# Patient Record
Sex: MALE | Race: WHITE
[De-identification: names, ages, dates, MRNs, and addresses within clinical notes are randomized per-mention and may not be internally consistent; named-entity substitution may affect disease eponyms.]

---

## 2020-04-21 ENCOUNTER — HOSPITAL ENCOUNTER (INPATIENT)
Dept: HOSPITAL 92 - ERS | Age: 60
LOS: 4 days | Discharge: HOME | DRG: 247 | End: 2020-04-25
Attending: INTERNAL MEDICINE | Admitting: INTERNAL MEDICINE
Payer: SELF-PAY

## 2020-04-21 ENCOUNTER — HOSPITAL ENCOUNTER (EMERGENCY)
Dept: HOSPITAL 18 - NAV ERS | Age: 60
End: 2020-04-21
Payer: SELF-PAY

## 2020-04-21 VITALS — BODY MASS INDEX: 31.3 KG/M2

## 2020-04-21 DIAGNOSIS — E78.5: ICD-10-CM

## 2020-04-21 DIAGNOSIS — E87.6: ICD-10-CM

## 2020-04-21 DIAGNOSIS — Z79.899: ICD-10-CM

## 2020-04-21 DIAGNOSIS — R00.1: ICD-10-CM

## 2020-04-21 DIAGNOSIS — I21.4: Primary | ICD-10-CM

## 2020-04-21 DIAGNOSIS — F17.210: ICD-10-CM

## 2020-04-21 DIAGNOSIS — E78.00: ICD-10-CM

## 2020-04-21 DIAGNOSIS — I25.10: ICD-10-CM

## 2020-04-21 DIAGNOSIS — E66.9: ICD-10-CM

## 2020-04-21 DIAGNOSIS — K21.9: ICD-10-CM

## 2020-04-21 DIAGNOSIS — I47.2: ICD-10-CM

## 2020-04-21 DIAGNOSIS — I42.9: ICD-10-CM

## 2020-04-21 LAB
ALBUMIN SERPL BCG-MCNC: 3.8 G/DL (ref 3.5–5)
ALP SERPL-CCNC: 76 U/L (ref 40–110)
ALT SERPL W P-5'-P-CCNC: 24 U/L (ref 8–55)
ANION GAP SERPL CALC-SCNC: 11 MMOL/L (ref 10–20)
ANION GAP SERPL CALC-SCNC: 14 MMOL/L (ref 10–20)
AST SERPL-CCNC: 28 U/L (ref 5–34)
BASOPHILS # BLD AUTO: 0.1 THOU/UL (ref 0–0.2)
BASOPHILS NFR BLD AUTO: 0.8 % (ref 0–1)
BILIRUB SERPL-MCNC: 0.3 MG/DL (ref 0.2–1.2)
BUN SERPL-MCNC: 13 MG/DL (ref 8.4–25.7)
BUN SERPL-MCNC: 13 MG/DL (ref 8.4–25.7)
CALCIUM SERPL-MCNC: 8.6 MG/DL (ref 7.8–10.44)
CALCIUM SERPL-MCNC: 8.8 MG/DL (ref 7.8–10.44)
CHD RISK SERPL-RTO: 5.1 (ref ?–4.5)
CHLORIDE SERPL-SCNC: 107 MMOL/L (ref 98–107)
CHLORIDE SERPL-SCNC: 109 MMOL/L (ref 98–107)
CHOLEST SERPL-MCNC: 190 MG/DL
CK MB SERPL-MCNC: 27.6 NG/ML (ref 0–6.6)
CK SERPL-CCNC: 333 U/L (ref 30–200)
CO2 SERPL-SCNC: 22 MMOL/L (ref 22–29)
CO2 SERPL-SCNC: 23 MMOL/L (ref 22–29)
CREAT CL PREDICTED SERPL C-G-VRATE: 0 ML/MIN (ref 70–130)
CREAT CL PREDICTED SERPL C-G-VRATE: 126 ML/MIN (ref 70–130)
EOSINOPHIL # BLD AUTO: 0.2 THOU/UL (ref 0–0.7)
EOSINOPHIL NFR BLD AUTO: 2.5 % (ref 0–10)
GLOBULIN SER CALC-MCNC: 2.5 G/DL (ref 2.4–3.5)
GLUCOSE SERPL-MCNC: 113 MG/DL (ref 70–105)
GLUCOSE SERPL-MCNC: 135 MG/DL (ref 70–105)
HDLC SERPL-MCNC: 37 MG/DL
HGB BLD-MCNC: 13.7 G/DL (ref 14–18)
HGB BLD-MCNC: 13.9 G/DL (ref 14–18)
LDLC SERPL CALC-MCNC: 126 MG/DL
LYMPHOCYTES # BLD AUTO: 3 THOU/UL (ref 1.2–3.4)
LYMPHOCYTES NFR BLD AUTO: 36.8 % (ref 21–51)
MAGNESIUM SERPL-MCNC: 2.1 MG/DL (ref 1.6–2.6)
MCH RBC QN AUTO: 31.6 PG (ref 27–31)
MCV RBC AUTO: 94.5 FL (ref 78–98)
MONOCYTES # BLD AUTO: 0.6 THOU/UL (ref 0.11–0.59)
MONOCYTES NFR BLD AUTO: 7.4 % (ref 0–10)
NEUTROPHILS # BLD AUTO: 4.2 THOU/UL (ref 1.4–6.5)
NEUTROPHILS NFR BLD AUTO: 52.6 % (ref 42–75)
PLATELET # BLD AUTO: 209 THOU/UL (ref 130–400)
PLATELET # BLD AUTO: 216 THOU/UL (ref 130–400)
POTASSIUM SERPL-SCNC: 3.3 MMOL/L (ref 3.5–5.1)
POTASSIUM SERPL-SCNC: 3.6 MMOL/L (ref 3.5–5.1)
RBC # BLD AUTO: 4.39 MILL/UL (ref 4.7–6.1)
SODIUM SERPL-SCNC: 139 MMOL/L (ref 136–145)
SODIUM SERPL-SCNC: 140 MMOL/L (ref 136–145)
TRIGL SERPL-MCNC: 134 MG/DL (ref ?–150)
TROPONIN I SERPL DL<=0.01 NG/ML-MCNC: 3.71 NG/ML (ref ?–0.03)
TROPONIN I SERPL DL<=0.01 NG/ML-MCNC: 7.48 NG/ML (ref ?–0.03)
WBC # BLD AUTO: 8 THOU/UL (ref 4.8–10.8)

## 2020-04-21 PROCEDURE — 80061 LIPID PANEL: CPT

## 2020-04-21 PROCEDURE — 5A1223Z PERFORMANCE OF CARDIAC PACING, CONTINUOUS: ICD-10-PCS | Performed by: INTERNAL MEDICINE

## 2020-04-21 PROCEDURE — 83735 ASSAY OF MAGNESIUM: CPT

## 2020-04-21 PROCEDURE — 84439 ASSAY OF FREE THYROXINE: CPT

## 2020-04-21 PROCEDURE — C1874 STENT, COATED/COV W/DEL SYS: HCPCS

## 2020-04-21 PROCEDURE — 93454 CORONARY ARTERY ANGIO S&I: CPT

## 2020-04-21 PROCEDURE — 83880 ASSAY OF NATRIURETIC PEPTIDE: CPT

## 2020-04-21 PROCEDURE — 85025 COMPLETE CBC W/AUTO DIFF WBC: CPT

## 2020-04-21 PROCEDURE — 76942 ECHO GUIDE FOR BIOPSY: CPT

## 2020-04-21 PROCEDURE — C1757 CATH, THROMBECTOMY/EMBOLECT: HCPCS

## 2020-04-21 PROCEDURE — C9600 PERC DRUG-EL COR STENT SING: HCPCS

## 2020-04-21 PROCEDURE — 92928 PRQ TCAT PLMT NTRAC ST 1 LES: CPT

## 2020-04-21 PROCEDURE — 85730 THROMBOPLASTIN TIME PARTIAL: CPT

## 2020-04-21 PROCEDURE — 33210 INSERT ELECTRD/PM CATH SNGL: CPT

## 2020-04-21 PROCEDURE — C1725 CATH, TRANSLUMIN NON-LASER: HCPCS

## 2020-04-21 PROCEDURE — 94760 N-INVAS EAR/PLS OXIMETRY 1: CPT

## 2020-04-21 PROCEDURE — 80048 BASIC METABOLIC PNL TOTAL CA: CPT

## 2020-04-21 PROCEDURE — 92973 PRQ TRLUML C MCHN ASP THRMBC: CPT

## 2020-04-21 PROCEDURE — 96372 THER/PROPH/DIAG INJ SC/IM: CPT

## 2020-04-21 PROCEDURE — C1887 CATHETER, GUIDING: HCPCS

## 2020-04-21 PROCEDURE — 96360 HYDRATION IV INFUSION INIT: CPT

## 2020-04-21 PROCEDURE — 84484 ASSAY OF TROPONIN QUANT: CPT

## 2020-04-21 PROCEDURE — B2111ZZ FLUOROSCOPY OF MULTIPLE CORONARY ARTERIES USING LOW OSMOLAR CONTRAST: ICD-10-PCS | Performed by: INTERNAL MEDICINE

## 2020-04-21 PROCEDURE — C1769 GUIDE WIRE: HCPCS

## 2020-04-21 PROCEDURE — 71045 X-RAY EXAM CHEST 1 VIEW: CPT

## 2020-04-21 PROCEDURE — 93306 TTE W/DOPPLER COMPLETE: CPT

## 2020-04-21 PROCEDURE — 84443 ASSAY THYROID STIM HORMONE: CPT

## 2020-04-21 PROCEDURE — 80053 COMPREHEN METABOLIC PANEL: CPT

## 2020-04-21 PROCEDURE — 93005 ELECTROCARDIOGRAM TRACING: CPT

## 2020-04-21 PROCEDURE — 99153 MOD SED SAME PHYS/QHP EA: CPT

## 2020-04-21 PROCEDURE — 93010 ELECTROCARDIOGRAM REPORT: CPT

## 2020-04-21 PROCEDURE — 93798 PHYS/QHP OP CAR RHAB W/ECG: CPT

## 2020-04-21 PROCEDURE — 99152 MOD SED SAME PHYS/QHP 5/>YRS: CPT

## 2020-04-21 PROCEDURE — 027034Z DILATION OF CORONARY ARTERY, ONE ARTERY WITH DRUG-ELUTING INTRALUMINAL DEVICE, PERCUTANEOUS APPROACH: ICD-10-PCS | Performed by: INTERNAL MEDICINE

## 2020-04-21 PROCEDURE — 99285 EMERGENCY DEPT VISIT HI MDM: CPT

## 2020-04-21 PROCEDURE — 36415 COLL VENOUS BLD VENIPUNCTURE: CPT

## 2020-04-21 PROCEDURE — 82553 CREATINE MB FRACTION: CPT

## 2020-04-21 PROCEDURE — 83036 HEMOGLOBIN GLYCOSYLATED A1C: CPT

## 2020-04-21 PROCEDURE — 82550 ASSAY OF CK (CPK): CPT

## 2020-04-21 PROCEDURE — 4A023N7 MEASUREMENT OF CARDIAC SAMPLING AND PRESSURE, LEFT HEART, PERCUTANEOUS APPROACH: ICD-10-PCS | Performed by: INTERNAL MEDICINE

## 2020-04-21 PROCEDURE — 02C03ZZ EXTIRPATION OF MATTER FROM CORONARY ARTERY, ONE ARTERY, PERCUTANEOUS APPROACH: ICD-10-PCS | Performed by: INTERNAL MEDICINE

## 2020-04-21 RX ADMIN — Medication SCH ML: at 20:38

## 2020-04-21 RX ADMIN — Medication SCH ML: at 08:21

## 2020-04-21 NOTE — CON
DATE OF CONSULTATION:  04/21/2020



REASON FOR CONSULTATION:  Non-ST-segment MI.



HISTORY OF PRESENT ILLNESS:  Mr. Mustafa is a 59-year-old gentleman, who recently

presented with acute onset chest pain.  It began at 1:30 in the morning.  He

presented to the emergency room with the above.  He was given one dose of Lovenox.

He was placed on telemetry monitoring.  I was called at 8 o'clock this morning,

because the patient continued to have pain with elevated troponin of 7.0.  The

patient does have a previous history of tobacco abuse. 



PAST MEDICAL HISTORY:  Acid reflux.



SOCIAL HISTORY:  As above.  He is currently .



FAMILY HISTORY:  Positive for MI.



ALLERGIES:  NONE.



MEDICATIONS:  None.



REVIEW OF SYSTEMS:  A 10-point review of systems is reviewed and is as above,

otherwise negative. 



PHYSICAL EXAMINATION:

GENERAL:  Patient is a pleasant male who is in no acute distress.  The patient

appears their stated age. 

VITAL SIGNS:  Blood pressure 110/70, pulse 80, respirations 20. 

NEUROLOGIC:  The patient is alert and oriented x3 with no focal neurologic deficits. 

HEENT:  Sclerae without icterus.  Mouth has moist mucous membranes with normal

pallor. 

NECK:  No JVD.  Carotid upstroke brisk.  No bruits bilaterally. 

LUNGS:  Clear to auscultation with unlabored respirations. 

BACK:  No scoliosis or kyphosis. 

CARDIAC:  Regular rate and rhythm with normal S1 and S2.  No S3 or S4 noted.  No

significant rubs, murmurs, thrills, or gallops noted throughout the precordium.  PMI

is not displaced.  There is no parasternal heave. 

ABDOMEN:  Soft, nontender, nondistended.  No peritoneal signs present.  No

hepatosplenomegaly.  No abnormal striae. 

EXTREMITIES:  2+ femoral and 2+ dorsalis pedis pulses.  No cyanosis, clubbing, or

edema. 

SKIN:  No gross abnormalities.



PERTINENT LABORATORY DATA:  Hemoglobin 13.7.  Creatinine 0.91.  Troponin 7.48.  BNP

of 114. 



EKG, normal sinus rhythm with Q-waves noted inferiorly.



IMPRESSION:  

1. Elevated troponin.

2. Unstable angina.

3. Tobacco abuse.



RECOMMENDATIONS:  Given Mr. Danielles troponin is positive with continued pain and

episodes of bradycardia with a 4-second pause, I am concerned about stenosis or

occlusion of the right coronary artery.  Given continued pain, I would recommend

urgent coronary angiography plus PCI.  I discussed the procedure in full detail with

Mr. Mustafa.  The risks of the procedure were also discussed.  The risks of the

procedure include but are not limited to the following:  Death, stroke, MI, need for

emergency surgery, loss of limb, bleeding, and infection, as well as a reaction to

the dye causing kidney failure and needing long-term dialysis.  I also discussed the

risks of PCI to include all of the above including coronary dissection and

perforation in addition to acute stent thrombosis and restenosis.  All questions

about the procedure were answered.  Given the above, the patient agreed to proceed

with coronary angiography and possible PCI.  Also I discussed drug-coated versus

nondrug-coated stent placement.  There were no complications proceed if needed.

Further recommendations pending the above. 







Job ID:  [id number]

## 2020-04-21 NOTE — EKG
Test Reason : POST STENT-RCA

Blood Pressure : ***/*** mmHG

Vent. Rate : 050 BPM     Atrial Rate : 051 BPM

   P-R Int : 000 ms          QRS Dur : 158 ms

    QT Int : 584 ms       P-R-T Axes : 000 088 -67 degrees

   QTc Int : 532 ms

 

Electronic ventricular pacemaker

When compared with ECG of 21-APR-2020 08:17, (Unconfirmed)

Electronic ventricular pacemaker has replaced Sinus rhythm

Confirmed by DR. NATALIA MARTINEZ (3) on 4/21/2020 5:41:37 PM

 

Referred By:  CECE           Confirmed By:DR. NATALIA MARTINEZ

## 2020-04-21 NOTE — HP
CHIEF COMPLAINT:  Chest pain.



HISTORY OF PRESENT ILLNESS:  Mr. Mustafa is a 59-year-old male with a past medical

history of GERD, presents to [facility] with chest pain that has been

going on and off for the past 2 weeks.  Last evening, the patient had the pain

severe enough to tell his wife, and she gave him nitroglycerin and aspirin.  He

reports dizziness and weakness for the past 3 weeks, including episodes where he

thought he might pass out.  On workup in the emergency room, the patient had an

elevated troponin, more than 1.  The patient was bradycardic with heart rate in the

40s.  The patient transferred to this facility for further management and Cardiology

consultation.  In the emergency room, the patient received aspirin and Lovenox 1

mg/kg. 



PAST MEDICAL HISTORY:  GERD.



PAST SURGICAL HISTORY:  No surgical history.



SOCIAL HISTORY:  He currently smokes about a pack a day.



FAMILY HISTORY:  Father had a heart attack in his 50s.



ALLERGIES:  NO KNOWN ALLERGIES.



HOME MEDICATIONS:  Please see home medication reconciliation form for updated

medications. 



REVIEW OF SYSTEMS:  A review of 14 systems is negative, except what is mentioned in

the history of present illness. 



PHYSICAL EXAMINATION:

VITAL SIGNS:  Blood pressure is 106/66, pulse is 48, respiratory rate is 14, and

temperature is 97.5. 

HEAD AND NECK:  Normocephalic and atraumatic.  Neck is supple.  No JVD. 

CHEST:  Fair bilateral air entry. 

HEART:  S1 and S2.  Regular. 

ABDOMEN:  Soft and nontender.  Bowel sounds are present. 

NEUROLOGIC:  Awake, alert, and oriented x3. 

PSYCHIATRIC:  Normal mood. 

EXTREMITIES:  No clubbing or cyanosis. 

GENITOURINARY:  No suprapubic tenderness.  No flank tenderness.



LABORATORIES:  WBC 8.0, hemoglobin 13.9, and platelets 216.  Sodium 140, potassium

3.3, BUN 13, and creatinine 1.1.  Troponin is 1.13. 



ASSESSMENT:  

1. Non-ST-elevation myocardial infarction.

2. Hypokalemia.

3. Family history of coronary artery disease.

4. Cigarette smoker.



PLAN:  

1. Admit.

2. Telemetry monitoring.

3. Serial troponins.

4. The patient was given Lovenox in the ED 1 mg/kg, further anticoagulation as per

Cardiology. 

5. Keep the patient n.p.o. for now.

6. Consult Cardiology in the a.m. for evaluation and further recommendations.

7. Reconcile home medications.

8. DVT prophylaxis as appropriate.

9. Expected length of stay is 2 midnights or more.







Job ID:  [id number]

## 2020-04-21 NOTE — RAD
Exam: Chest one view



HISTORY:Chest pain



Comparison: None



FINDINGS:

Cardiac silhouette: Normal

Aorta: Unremarkable

Pulmonary vessels: Normal caliber

Costophrenic angles: Clear



LUNGS: No masses or consolidation. Scattered reticulonodular opacities.

Pneumothorax: None



Osseous abnormalities: None



IMPRESSION: Scattered reticular nodular opacities. Correlate for infiltrate versus interstitial edema
.



Reported By: Herminia Varghese 

Electronically Signed:  4/21/2020 7:48 AM

## 2020-04-21 NOTE — EKG
Test Reason : C/O CHEST PAIN

Blood Pressure : ***/*** mmHG

Vent. Rate : 040 BPM     Atrial Rate : 040 BPM

   P-R Int : 210 ms          QRS Dur : 088 ms

    QT Int : 522 ms       P-R-T Axes : 066 060 165 degrees

   QTc Int : 425 ms

 

Marked sinus bradycardia with marked sinus arrhythmia with 1st degree A-V block

Possible Inferior infarct (cited on or before 21-APR-2020)



Abnormal ECG

When compared with ECG of 21-APR-2020 02:58, (Unconfirmed)

No significant change was found

Confirmed by DR. NATALIA MARTINEZ (3) on 4/21/2020 5:39:58 PM

 

Referred By:  VINNY           Confirmed By:DR. NATALIA MARTINEZ

## 2020-04-22 LAB
ALBUMIN SERPL BCG-MCNC: 3.4 G/DL (ref 3.5–5)
ALP SERPL-CCNC: 65 U/L (ref 40–110)
ALT SERPL W P-5'-P-CCNC: 32 U/L (ref 8–55)
ANION GAP SERPL CALC-SCNC: 10 MMOL/L (ref 10–20)
AST SERPL-CCNC: 107 U/L (ref 5–34)
BASOPHILS # BLD AUTO: 0.1 THOU/UL (ref 0–0.2)
BASOPHILS NFR BLD AUTO: 0.9 % (ref 0–1)
BILIRUB SERPL-MCNC: 0.7 MG/DL (ref 0.2–1.2)
BUN SERPL-MCNC: 11 MG/DL (ref 8.4–25.7)
CALCIUM SERPL-MCNC: 8.3 MG/DL (ref 7.8–10.44)
CHLORIDE SERPL-SCNC: 110 MMOL/L (ref 98–107)
CO2 SERPL-SCNC: 21 MMOL/L (ref 22–29)
CREAT CL PREDICTED SERPL C-G-VRATE: 141 ML/MIN (ref 70–130)
EOSINOPHIL # BLD AUTO: 0.1 THOU/UL (ref 0–0.7)
EOSINOPHIL NFR BLD AUTO: 1.1 % (ref 0–10)
GLOBULIN SER CALC-MCNC: 2.3 G/DL (ref 2.4–3.5)
GLUCOSE SERPL-MCNC: 104 MG/DL (ref 70–105)
HGB BLD-MCNC: 13.1 G/DL (ref 14–18)
LYMPHOCYTES # BLD: 2.3 THOU/UL (ref 1.2–3.4)
LYMPHOCYTES NFR BLD AUTO: 28.2 % (ref 21–51)
MAGNESIUM SERPL-MCNC: 1.9 MG/DL (ref 1.6–2.6)
MCH RBC QN AUTO: 31.7 PG (ref 27–31)
MCV RBC AUTO: 96.6 FL (ref 78–98)
MONOCYTES # BLD AUTO: 0.8 THOU/UL (ref 0.11–0.59)
MONOCYTES NFR BLD AUTO: 9.7 % (ref 0–10)
NEUTROPHILS # BLD AUTO: 4.9 THOU/UL (ref 1.4–6.5)
NEUTROPHILS NFR BLD AUTO: 60.1 % (ref 42–75)
PLATELET # BLD AUTO: 170 THOU/UL (ref 130–400)
POTASSIUM SERPL-SCNC: 3.4 MMOL/L (ref 3.5–5.1)
RBC # BLD AUTO: 4.13 MILL/UL (ref 4.7–6.1)
SODIUM SERPL-SCNC: 138 MMOL/L (ref 136–145)
WBC # BLD AUTO: 8.1 THOU/UL (ref 4.8–10.8)

## 2020-04-22 RX ADMIN — Medication SCH ML: at 09:11

## 2020-04-22 RX ADMIN — Medication SCH ML: at 21:03

## 2020-04-22 RX ADMIN — ASPIRIN 81 MG CHEWABLE TABLET SCH MG: 81 TABLET CHEWABLE at 08:48

## 2020-04-22 NOTE — CON
DATE OF CONSULTATION:  



HISTORY OF PRESENT ILLNESS:  A 59-year-old gentleman who is status post emergency

cardiac cath with intervention.  Please review the cardiologist's note. 



The patient is a pack-a-day smoker for 30 years without prior history of TB,

pneumonia, or bronchial asthma.  In fact, he tells me this is the first time in the

hospital.  He is bradycardic. 



Emergency cardiac cath revealed occlusion of his RCA.  He had a stent placed. 



Most days, he has no issues, no shortness of breath, coughing, or wheezing.



PAST MEDICAL HISTORY:  High cholesterol.



PREVIOUS SURGERIES:  None.



CARDIAC MEDICATION:  Prilosec.



SOCIAL HISTORY:  Alcohol, none.  Tobacco, pack a day.  Social history otherwise

unremarkable. 



FAMILY HISTORY:  Unremarkable.



REVIEW OF SYSTEMS:  Ten-point negative.  He may snore.



PHYSICAL EXAMINATION:

VITAL SIGNS:  His ICU pulse is 63.  He has a temporary pacemaker.  Blood pressure

125/60, respiratory rate 18, pulse 80, afebrile. 

GENERAL:  No distress. 

CHEST:  Decreased breath sounds.  No wheezing. 

CARDIAC:  Normal S1 and S2. 

ABDOMEN:  No masses.



DIAGNOSTIC STUDIES:  Chest x-ray is normal.  Thyroid function is normal.  BNP is

normal.  H and H are stable.  Lytes are normal. 



IMPRESSION:  Acute coronary syndrome, emergency cardiac cath, significant

bradycardia with a temporary pacemaker. 



Continue supportive care.  We will follow while in the ICU.  He was advised to

refrain from smoking. 



Consultation note, 70 minutes, 50% direct patient care.







Job ID:  [id number]

## 2020-04-22 NOTE — EKG
Test Reason : 

Blood Pressure : ***/*** mmHG

Vent. Rate : 052 BPM     Atrial Rate : 052 BPM

   P-R Int : 166 ms          QRS Dur : 088 ms

    QT Int : 474 ms       P-R-T Axes : 070 051 -70 degrees

   QTc Int : 440 ms

 

Sinus bradycardia

Inferior infarct , age undetermined



Abnormal ECG

When compared with ECG of 21-APR-2020 10:43,

Sinus rhythm has replaced Electronic ventricular pacemaker

Confirmed by DR. NATALIA MARTINEZ (3) on 4/22/2020 2:03:44 PM

 

Referred By:  CECE           Confirmed By:DR. NATALIA MARTINEZ

## 2020-04-22 NOTE — PRG
DATE OF SERVICE:  04/22/2020



SUBJECTIVE:  Mr. Mustafa is doing much better.  No current complaints.  No chest pain

or pressure noted.  The temporary pacemaker is stable.  He is no longer pacer

dependent as he was yesterday morning and afternoon. 



OBJECTIVE:  VITAL SIGNS:  Blood pressure 120/76, pulse 64, temperature afebrile. 

LUNGS:  Clear to auscultation. 

HEART:  Regular rate and rhythm. 

ABDOMEN:  Soft, nontender, and nondistended. 

EXTREMITIES:  No edema.



IMPRESSION:  

1. Acute myocardial infarction.

2. Bradycardia secondary to recent myocardial infarction.

3. Tobacco abuse.



RECOMMENDATIONS:  

1. Mr. Mustafa is currently doing well.  We will turn off pacemaker for now and

discontinue this p.m. if stable. 

2.  on cessation of all tobacco products.

3. Hold beta-blocker therapy given recent bradycardia.

4. Continue aspirin and atorvastatin in addition to Plavix and pantoprazole.

5. Plan is to keep in the ICU overnight and transfer to a regular room tomorrow if

stable. 







Job ID:  [id number]

## 2020-04-22 NOTE — PDOC.HOSPP
- Subjective


Encounter Date: 04/22/20


Encounter Time: 09:30


Subjective: 





Patient seen and examined for NSTEMI. No CP. On NTG drip. No new complaints. No 

overnight events





- Objective


Vital Signs & Weight: 


 Vital Signs (12 hours)











  Temp Pulse Resp BP Pulse Ox


 


 04/22/20 16:00   67  22 H  153/82 H 


 


 04/22/20 12:00   62  19  142/75 H 


 


 04/22/20 08:00  98.2 F    


 


 04/22/20 07:58      97








 Weight











Weight                         224 lb 6.4 oz











 Most Recent Monitor Data











Heart Rate from ECG            60


 


NIBP                           142/83


 


NIBP BP-Mean                   102


 


Respiration from ECG           16


 


SpO2                           98














I&O: 


 











 04/21/20 04/22/20 04/23/20





 06:59 06:59 06:59


 


Intake Total  2240.7 513.2


 


Output Total  1375 1145


 


Balance  865.7 -631.8











Result Diagrams: 


 04/22/20 03:05





 04/22/20 03:05


EKG Reviewed by me: Yes (Tele SR)





Hospitalist ROS





- Review of Systems


Respiratory: denies: cough, dry, shortness of breath, hemoptysis, SOB with 

excertion, pleuritic pain, sputum, wheezing, other


Cardiovascular: denies: chest pain, palpitations, orthopnea, paroxysmal noc. 

dyspnea, edema, light headedness, other





- Medication


Medications: 


Active Medications











Generic Name Dose Route Start Last Admin





  Trade Name Freq  PRN Reason Stop Dose Admin


 


Aspirin  81 mg  04/22/20 09:00  04/22/20 08:48





  Aspirin Chewable  PO   81 mg





  DAILY CHICHI   Administration





     





     





     





     


 


Atorvastatin Calcium  80 mg  04/21/20 21:00  04/21/20 20:23





  Lipitor  PO   80 mg





  HS CHICHI   Administration





     





     





     





     


 


Clopidogrel Bisulfate  75 mg  04/22/20 09:00  04/22/20 08:48





  Plavix  PO   75 mg





  DAILY CHICHI   Administration





     





     





     





     


 


Morphine Sulfate  2 mg  04/21/20 04:16  04/21/20 15:33





  Morphine  SLOW IVP   2 mg





  Q4H PRN   Administration





  Moderate to Severe Pain (6-10)   





     





     





     


 


Pantoprazole Sodium  40 mg  04/21/20 21:00  04/21/20 20:23





  Protonix  PO   40 mg





  HS CHICHI   Administration





     





     





     





     


 


Sodium Chloride  10 ml  04/21/20 09:00  04/22/20 09:11





  Flush - Normal Saline  IVF   10 ml





  Q12HR CHICHI   Administration





     





     





     





     














- Exam


General Appearance: NAD


Neck: supple, no JVD


Heart: RRR, no gallops, no rubs, normal peripheral pulses


Respiratory: CTAB, no wheezes, no rales, no ronchi


Gastrointestinal: soft, non-tender, non-distended, normal bowel sounds, no 

guarding, no rigidity


Extremities: no cyanosis, no clubbing, no edema


Neurological: no new deficit


Psychiatric: normal affect, A&O x 3





Hosp A/P





- Plan


DVT proph w/SCDs





NSTEMI


s/p RCA stent


Bradycardia with sinus pauses due to above


Tob dep


Obesity BMI 31.3


GERD





PLAN:


Cont ASA/Plavix


Cont NTG drip


Temporary pacer turned off


Counselled to quit smoking


AM labs


No Betablockers due to bradycardia

## 2020-04-23 LAB
ANION GAP SERPL CALC-SCNC: 10 MMOL/L (ref 10–20)
ANION GAP SERPL CALC-SCNC: 12 MMOL/L (ref 10–20)
BASOPHILS # BLD AUTO: 0 THOU/UL (ref 0–0.2)
BASOPHILS NFR BLD AUTO: 0.4 % (ref 0–1)
BUN SERPL-MCNC: 12 MG/DL (ref 8.4–25.7)
BUN SERPL-MCNC: 9 MG/DL (ref 8.4–25.7)
CALCIUM SERPL-MCNC: 8.7 MG/DL (ref 7.8–10.44)
CALCIUM SERPL-MCNC: 9.2 MG/DL (ref 7.8–10.44)
CHLORIDE SERPL-SCNC: 108 MMOL/L (ref 98–107)
CHLORIDE SERPL-SCNC: 108 MMOL/L (ref 98–107)
CO2 SERPL-SCNC: 24 MMOL/L (ref 22–29)
CO2 SERPL-SCNC: 25 MMOL/L (ref 22–29)
CREAT CL PREDICTED SERPL C-G-VRATE: 132 ML/MIN (ref 70–130)
CREAT CL PREDICTED SERPL C-G-VRATE: 138 ML/MIN (ref 70–130)
EOSINOPHIL # BLD AUTO: 0.1 THOU/UL (ref 0–0.7)
EOSINOPHIL NFR BLD AUTO: 1.7 % (ref 0–10)
GLUCOSE SERPL-MCNC: 90 MG/DL (ref 70–105)
GLUCOSE SERPL-MCNC: 96 MG/DL (ref 70–105)
HGB BLD-MCNC: 14 G/DL (ref 14–18)
LYMPHOCYTES # BLD: 2.3 THOU/UL (ref 1.2–3.4)
LYMPHOCYTES NFR BLD AUTO: 25.7 % (ref 21–51)
MAGNESIUM SERPL-MCNC: 2.1 MG/DL (ref 1.6–2.6)
MCH RBC QN AUTO: 32.5 PG (ref 27–31)
MCV RBC AUTO: 95.6 FL (ref 78–98)
MONOCYTES # BLD AUTO: 0.8 THOU/UL (ref 0.11–0.59)
MONOCYTES NFR BLD AUTO: 9.3 % (ref 0–10)
NEUTROPHILS # BLD AUTO: 5.6 THOU/UL (ref 1.4–6.5)
NEUTROPHILS NFR BLD AUTO: 63 % (ref 42–75)
PLATELET # BLD AUTO: 188 THOU/UL (ref 130–400)
POTASSIUM SERPL-SCNC: 3.7 MMOL/L (ref 3.5–5.1)
POTASSIUM SERPL-SCNC: 3.8 MMOL/L (ref 3.5–5.1)
RBC # BLD AUTO: 4.3 MILL/UL (ref 4.7–6.1)
SODIUM SERPL-SCNC: 139 MMOL/L (ref 136–145)
SODIUM SERPL-SCNC: 140 MMOL/L (ref 136–145)
WBC # BLD AUTO: 8.9 THOU/UL (ref 4.8–10.8)

## 2020-04-23 RX ADMIN — Medication SCH ML: at 08:03

## 2020-04-23 RX ADMIN — Medication SCH ML: at 20:51

## 2020-04-23 RX ADMIN — ASPIRIN 81 MG CHEWABLE TABLET SCH MG: 81 TABLET CHEWABLE at 08:02

## 2020-04-23 NOTE — PDOC.HOSPP
- Subjective


Encounter Date: 04/23/20


Encounter Time: 17:00


Subjective: 





Patient seen and examined for NSTEMI. No new CP/Palpitations. No new 

complaints. No overnight events





- Objective


Vital Signs & Weight: 


 Vital Signs (12 hours)











  Temp Pulse Pulse Pulse Resp BP BP


 


 04/23/20 15:12  97.9 F  62    16  


 


 04/23/20 11:11       


 


 04/23/20 11:00  97.8 F  52 L    16  


 


 04/23/20 08:52    64  76   135/71  128/75


 


 04/23/20 08:00  98.1 F      


 


 04/23/20 07:52       














  BP Pulse Ox Pulse Ox Pulse Ox


 


 04/23/20 15:12  147/71 H  98  


 


 04/23/20 11:11   97  


 


 04/23/20 11:00  127/81  97  


 


 04/23/20 08:52    98  100


 


 04/23/20 08:00    


 


 04/23/20 07:52   93 L  








 Weight











Weight                         224 lb 6.4 oz











 Most Recent Monitor Data











Heart Rate from ECG            52


 


NIBP                           111/59


 


NIBP BP-Mean                   76


 


Respiration from ECG           16


 


SpO2                           96














I&O: 


 











 04/22/20 04/23/20 04/24/20





 06:59 06:59 06:59


 


Intake Total 2240.7 613.2 1220


 


Output Total 1375 3720 1250


 


Balance 865.7 -3106.8 -30











Result Diagrams: 


 04/23/20 03:17





 04/24/20 07:09


EKG Reviewed by me: Yes (Tele SB)





Hospitalist ROS





- Review of Systems


Respiratory: denies: cough, dry, shortness of breath, hemoptysis, SOB with 

excertion, pleuritic pain, sputum, wheezing, other


Cardiovascular: denies: chest pain, palpitations, orthopnea, paroxysmal noc. 

dyspnea, edema, light headedness, other





- Medication


Medications: 


Active Medications











Generic Name Dose Route Start Last Admin





  Trade Name Freq  PRN Reason Stop Dose Admin


 


Aspirin  81 mg  04/22/20 09:00  04/23/20 08:02





  Aspirin Chewable  PO   81 mg





  DAILY CHICHI   Administration





     





     





     





     


 


Atorvastatin Calcium  80 mg  04/21/20 21:00  04/22/20 21:02





  Lipitor  PO   80 mg





  HS CHICHI   Administration





     





     





     





     


 


Clopidogrel Bisulfate  75 mg  04/22/20 09:00  04/23/20 08:02





  Plavix  PO   75 mg





  DAILY CHICHI   Administration





     





     





     





     


 


Morphine Sulfate  2 mg  04/21/20 04:16  04/21/20 15:33





  Morphine  SLOW IVP   2 mg





  Q4H PRN   Administration





  Moderate to Severe Pain (6-10)   





     





     





     


 


Pantoprazole Sodium  40 mg  04/21/20 21:00  04/22/20 21:03





  Protonix  PO   40 mg





  HS CHICHI   Administration





     





     





     





     


 


Sodium Chloride  10 ml  04/21/20 09:00  04/23/20 08:03





  Flush - Normal Saline  IVF   10 ml





  Q12HR CHICHI   Administration





     





     





     





     














- Exam


General Appearance: NAD


Heart: RRR, no gallops


Respiratory: no wheezes, no ronchi


Gastrointestinal: non-tender, non-distended


Extremities: no cyanosis


Neurological: no new deficit





Hosp A/P





- Plan


DVT proph w/SCDs





NSTEMI


s/p RCA stent


Bradycardia with sinus pauses due to above


Tob dep


Obesity BMI 31.3


GERD





PLAN:


Cont ASA/Plavix


Cont Statins


Counselled to quit smoking


Transferred to Tele


No Betablockers due to bradycardia

## 2020-04-23 NOTE — PRG
DATE OF SERVICE:  04/23/2020



SUBJECTIVE:  Mr. Mustafa is doing very well.  His heart rate is stable overnight

after removing the temporary pacemaker.  No current complaints. 



OBJECTIVE:  VITAL SIGNS:  Blood pressure 127/81, pulse __________ temperature 97.8. 

LUNGS:  Clear to auscultation. 

HEART:  Regular rate and rhythm. 

ABDOMEN:  Soft, nontender, nondistended. 

EXTREMITIES:  No edema.



PERTINENT LABORATORY DATA:  Hemoglobin 14.



IMPRESSION:  

1. Non-Q-wave myocardial infarction.

2. Occluded right coronary artery.

3. Tobacco abuse.



RECOMMENDATIONS:  

1. Continue aspirin 81 q.a.m.

2. Continue atorvastatin 80 at bedtime.

3. Continue Plavix 75 daily.

4. Avoid beta-blocker therapy until the outpatient setting due to recent bradycardia.

5. Cessation of all tobacco products.

6. Okay from my standpoint to discharge home in a.m.  The patient is from the

[place], [place] area, and recommend he follow up with a cardiologist in the region. 







Job ID:  [id number]

## 2020-04-24 LAB
ANION GAP SERPL CALC-SCNC: 13 MMOL/L (ref 10–20)
BUN SERPL-MCNC: 12 MG/DL (ref 8.4–25.7)
CALCIUM SERPL-MCNC: 9.3 MG/DL (ref 7.8–10.44)
CHLORIDE SERPL-SCNC: 107 MMOL/L (ref 98–107)
CO2 SERPL-SCNC: 24 MMOL/L (ref 22–29)
CREAT CL PREDICTED SERPL C-G-VRATE: 132 ML/MIN (ref 70–130)
GLUCOSE SERPL-MCNC: 86 MG/DL (ref 70–105)
POTASSIUM SERPL-SCNC: 4.1 MMOL/L (ref 3.5–5.1)
SODIUM SERPL-SCNC: 140 MMOL/L (ref 136–145)

## 2020-04-24 RX ADMIN — Medication SCH ML: at 20:25

## 2020-04-24 RX ADMIN — Medication SCH ML: at 17:58

## 2020-04-24 RX ADMIN — ASPIRIN 81 MG CHEWABLE TABLET SCH MG: 81 TABLET CHEWABLE at 08:07

## 2020-04-24 NOTE — CON
DATE OF CONSULTATION:  04/24/2020



REASON FOR CONSULTATION:  Nonsustained ventricular tachycardia. 



Consultation is performed by Dr. Fred Dorantes.



HISTORY OF PRESENT ILLNESS:  Mr. Mustafa is a 59-year-old gentleman who came to [facility] on the 21st of April with chest pain.  This has been transient for 2

weeks proceeding his time in the hospital.  He was taken to the cath lab by Dr. Adair.  Given his chest pain, his bradycardia, there was concern for an RCA

lesion.  He was found to have a 100% occluded RCA.  He had significant bradycardia

with pauses up to 4 seconds starting even before PCI.  A temporary pacemaker was

placed and left in for few days after his catheterization.  On the 22nd, the rates

returned down and he was found to be no longer dependent on his temporary pacer and

it was removed that night.  On the 23rd, the patient continued to be bradycardiac as

he had been with heart rates in the mid to low 50s and fairly stable and

asymptomatic with these lower rates.  At 10:30 at night, he had a bradycardic

episode that showed an idioventricular escape rhythm followed by a brief

nonsustained ventricular tachycardia run of approximately 6 beats before returning

to sinus bradycardia.  Other than that isolated incident, his rhythm has been fairly

stable since having his temporary pacemaker removed.  EP consultation was requested

in light of his nonsustained ventricular tachycardia runs. 



Mr. Mustafa feels well.  He is no longer experiencing chest pain.  He denies any

heart racing, palpitations, syncope, near syncope, stroke, or stroke-like symptoms.

He is unaware of his nonsustained VT episode last night and denies any passing-out

episodes or dizziness, lightheadedness, weakness, or fatigue that would suggest

symptomatic bradycardia. 



REVIEW OF SYSTEMS:  12-point review of systems is unremarkable except that listed

above in HPI. 



PAST MEDICAL HISTORY:  GERD.



SOCIAL HISTORY:  He is .  History of prior tobacco habituation, but not

recently.  Denies alcohol or illicit drug use. 



FAMILY HISTORY:  Positive for MI.



ALLERGIES:  NONE.



MEDICATIONS:  None.



OBJECTIVE:  MOST RECENT VITAL SIGNS:  Temperature 97.5, pulse 52, blood pressure

119/64, respirations 18, oxygen 95% on room air. 

GENERAL:  Patient is alert and oriented.  Speech is clear.  Affect is appropriate.

He is in no apparent distress.  At the time of exam, resting comfortably in bed. 

HEENT:  Normocephalic, atraumatic.  Sclerae anicteric.  EOMs are intact.  Oral

mucosa is moist and pink with adequate dentition. 

NECK:  Supple without jugular venous distention or lymphadenopathy. 

HEART:  His heart rate is regularly regular, but slow with a crisp S1-S2 and PMI is

nondisplaced. 

LUNGS:  Clear to auscultation bilaterally without wheezes, crackles, or rhonchi.

Respirations are even and nonlabored. 

ABDOMEN:  Soft and nontender without palpable masses.  Positive bowel sounds are

noted throughout.  Hepatojugular reflux is negative. 

EXTREMITIES:  Warm and dry to touch.  Well perfused without clubbing, cyanosis, or

edema. 

NEUROLOGIC:  Grossly intact and nonfocal.  Gait was not assessed.



LABORATORY AND DIAGNOSTIC STUDIES:  Echocardiogram on 04/24/2020, EF mildly reduced

at 45%.  Hypokinetic motion of the inferior septal wall and LV, mild MR, mild TR,

normal PAT. 



Laboratory:  Hematology unremarkable.  Chemistry all within normal ranges.

Creatinine 0.83, potassium 4.1, magnesium 1.9. 



Telemetry and EKG shows sinus bradycardia with rates in the mid to low 50s, an

episode of idioventricular escape rhythm was seen last night around 10:30 that

progressed into a nonsustained ventricular tachycardia run with approximately 6

beats before returning to sinus bradycardia. 



IMPRESSION:  

1. Asymptomatic sinus bradycardia with previously seen pauses up to 4 seconds

requiring transient temporary pacemaker for approximately 36 hours following

stenting of the RCA. 

2. Idioventricular rhythm, progressing to a short nonsustained ventricular

tachycardia and asymptomatic. 

3. Mildly reduced left ventricular ejection fraction of 45% by echo on 04/24

following an acute myocardial infarction. 

4. Coronary artery disease, acute myocardial infarction with PCI to the RCA for 100%

occlusion on 04/21/2020. 



PLAN AND RECOMMENDATIONS:  At this point, I suspect his ventricular arrhythmias and

possibly bradycardia are still related to his recent revascularization of the RCA.

There is no definite indication for pacing or ICD therapy.  Unfortunately, he is

unable to be given any type of beta-blocker therapy for his cardiomyopathy with

nonsustained VT in light of his baseline bradycardia.  He is asymptomatic with his

bradycardia and this may improve as he progresses further out from his PCI.  For

now, I would refrain from any type of device implants and recommend

guideline-directed medical therapy for his cardiomyopathies.  In addition, it would

be koch for him to discharge with a 30-day monitor in light of his nonsustained VT

and an inability to take beta blockers to watch his rhythm status as he moves out

from revascularization. 



Thank you for allowing me to participate in the care of this patient.  Please let me

know if there are any additional concerns that I may assist with. 







Job ID:  [id number]

## 2020-04-24 NOTE — CON
DATE OF CONSULTATION:  04/24/2020



SUBJECTIVE:  Mr. Mustafa is doing well.  No current complaints.  He did have episode

of nonsustained VT overnight.  He was asymptomatic.  Overall, LVEF estimated at 45%.

 There is hypokinesis along the inferoseptal region. 



OBJECTIVE:  VITAL SIGNS:  Blood pressure 119/64, pulse 83, and temperature is

afebrile. 

LUNGS:  Clear to auscultation. 

HEART:  Regular rate and rhythm. 

ABDOMEN:  Soft, nontender, and nondistended. 

EXTREMITIES:  No edema.



IMPRESSION:  

1. Non-Q-wave myocardial infarction.

2. Nonsustained ventricular tachycardia.

3. Bradycardia.



RECOMMENDATIONS:  Mr. Mustafa is less than 3 days out from his recent MI.  He did

have 6 beats of nonsustained VT.  His LVEF is 45%.  Indication will be for beta

blocker therapy, although not recommended given bradycardia.  We will have EP

consult to visit with Mr. Mustafa.  We would likely recommend he stay overnight to

further assess his rhythm.  If he is stable over the next 24 hours, will be okay to

discharge home with close outpatient followup. 







Job ID:  [id number]

## 2020-04-24 NOTE — PDOC.HOSPP
- Subjective


Encounter Date: 04/24/20


Encounter Time: 09:00


Subjective: 





Patient seen and examined for NSTEMI. No new CP. No new complaints. No 

overnight events





- Objective


Vital Signs & Weight: 


 Vital Signs (12 hours)











  Temp Pulse Pulse Pulse Resp BP BP


 


 04/24/20 16:25  97.5 F L  50 L    20  


 


 04/24/20 12:25  97.5 F L  80    20  


 


 04/24/20 09:10    53 L  58 L   129/75  119/64














  BP Pulse Ox Pulse Ox Pulse Ox


 


 04/24/20 16:25  118/67  94 L  


 


 04/24/20 12:25  137/72  95  


 


 04/24/20 09:10    97  97








 Weight











Weight                         214 lb 6.4 oz











 Most Recent Monitor Data











Heart Rate from ECG            52


 


NIBP                           111/59


 


NIBP BP-Mean                   76


 


Respiration from ECG           16


 


SpO2                           96














I&O: 


 











 04/23/20 04/24/20 04/25/20





 06:59 06:59 06:59


 


Intake Total 613.2 3020 1440


 


Output Total 3720 1250 


 


Balance -3106.8 1770 1440











Result Diagrams: 


 04/23/20 03:17





 04/24/20 07:09


EKG Reviewed by me: Yes (Tele SR)





Hospitalist ROS





- Review of Systems


Respiratory: denies: cough, dry, shortness of breath, hemoptysis, SOB with 

excertion, pleuritic pain, sputum, wheezing, other


Cardiovascular: denies: chest pain, palpitations, orthopnea, paroxysmal noc. 

dyspnea, edema, light headedness, other





- Medication


Medications: 


Active Medications











Generic Name Dose Route Start Last Admin





  Trade Name Freq  PRN Reason Stop Dose Admin


 


Aspirin  81 mg  04/22/20 09:00  04/24/20 08:07





  Aspirin Chewable  PO   81 mg





  DAILY CHICHI   Administration





     





     





     





     


 


Atorvastatin Calcium  80 mg  04/21/20 21:00  04/23/20 20:51





  Lipitor  PO   80 mg





  HS CHICHI   Administration





     





     





     





     


 


Clopidogrel Bisulfate  75 mg  04/22/20 09:00  04/24/20 08:07





  Plavix  PO   75 mg





  DAILY CHICHI   Administration





     





     





     





     


 


Morphine Sulfate  2 mg  04/21/20 04:16  04/21/20 15:33





  Morphine  SLOW IVP   2 mg





  Q4H PRN   Administration





  Moderate to Severe Pain (6-10)   





     





     





     


 


Pantoprazole Sodium  40 mg  04/21/20 21:00  04/23/20 20:51





  Protonix  PO   40 mg





  HS CHICHI   Administration





     





     





     





     


 


Sodium Chloride  10 ml  04/21/20 09:00  04/24/20 17:58





  Flush - Normal Saline  IVF   10 ml





  Q12HR CHICHI   Administration





     





     





     





     














- Exam


General Appearance: NAD


Heart: RRR, no gallops, no rubs


Heart - other findings: bradycardic


Respiratory: CTAB, no rales, no ronchi


Gastrointestinal: soft, non-distended


Extremities: no cyanosis, no edema





Hosp A/P





- Plan


DVT proph w/SCDs





NSTEMI


s/p RCA stent


Bradycardia with sinus pauses due to above


Tob dep - counselled 


Obesity BMI 31.3


GERD





PLAN:


Cont ASA/Plavix


Cont Statins


Await EP input due to bradycardia


No Betablockers due to bradycardia


DC home once cleared by Cardiology

## 2020-04-25 VITALS — DIASTOLIC BLOOD PRESSURE: 71 MMHG | TEMPERATURE: 98.3 F | SYSTOLIC BLOOD PRESSURE: 116 MMHG

## 2020-04-25 RX ADMIN — Medication SCH ML: at 07:40

## 2020-04-25 RX ADMIN — ASPIRIN 81 MG CHEWABLE TABLET SCH MG: 81 TABLET CHEWABLE at 07:40

## 2020-04-25 NOTE — PDOC.HOSPP
- Subjective


Encounter Date: 04/25/20


Encounter Time: 14:08


Subjective: 





Mr. Mustafa was seen today in follow-up of NSTEMI. He does not have any 

complaints.





- Objective


Vital Signs & Weight: 


 Vital Signs (12 hours)











  Temp Pulse Pulse Pulse Resp BP BP


 


 04/25/20 11:45  98.3 F  57 L    20  


 


 04/25/20 09:45       


 


 04/25/20 09:13    58 L  52 L   126/67  128/67


 


 04/25/20 07:38  97.6 F  55 L    18  


 


 04/25/20 03:37  98.2 F  50 L    18  














  BP Pulse Ox Pulse Ox Pulse Ox


 


 04/25/20 11:45  116/71  95  


 


 04/25/20 09:45   95  


 


 04/25/20 09:13    98  99


 


 04/25/20 07:38  119/70  96  


 


 04/25/20 03:37  120/76  96  








 Weight











Weight                         215 lb











 Most Recent Monitor Data











Heart Rate from ECG            52


 


NIBP                           111/59


 


NIBP BP-Mean                   76


 


Respiration from ECG           16


 


SpO2                           96














I&O: 


 











 04/24/20 04/25/20 04/26/20





 06:59 06:59 06:59


 


Intake Total 3020 2040 


 


Output Total 1250  


 


Balance 1770 2040 











Result Diagrams: 


 04/23/20 03:17





 04/24/20 07:09





Hospitalist ROS





- Medication


Medications: 


Active Medications











Generic Name Dose Route Start Last Admin





  Trade Name Freq  PRN Reason Stop Dose Admin


 


Aspirin  81 mg  04/22/20 09:00  04/25/20 07:40





  Aspirin Chewable  PO   81 mg





  DAILY CHICHI   Administration





     





     





     





     


 


Atorvastatin Calcium  80 mg  04/21/20 21:00  04/24/20 20:25





  Lipitor  PO   80 mg





  HS CHICHI   Administration





     





     





     





     


 


Clopidogrel Bisulfate  75 mg  04/22/20 09:00  04/25/20 07:40





  Plavix  PO   75 mg





  DAILY CHICHI   Administration





     





     





     





     


 


Morphine Sulfate  2 mg  04/21/20 04:16  04/21/20 15:33





  Morphine  SLOW IVP   2 mg





  Q4H PRN   Administration





  Moderate to Severe Pain (6-10)   





     





     





     


 


Pantoprazole Sodium  40 mg  04/21/20 21:00  04/24/20 20:25





  Protonix  PO   40 mg





  HS CHICHI   Administration





     





     





     





     


 


Sodium Chloride  10 ml  04/21/20 09:00  04/25/20 07:40





  Flush - Normal Saline  IVF   10 ml





  Q12HR CHICHI   Administration





     





     





     





     














- Exam


Eye: PERRL


Heart: RRR, no murmur, no gallops, no rubs, normal peripheral pulses


Respiratory: CTAB, no wheezes, no rales, no ronchi, normal chest expansion


Gastrointestinal: soft, non-tender, non-distended, normal bowel sounds, no 

palpable masses


Extremities: no cyanosis





Hosp A/P


(1) NSTEMI (non-ST elevated myocardial infarction)


Code(s): I21.4 - NON-ST ELEVATION (NSTEMI) MYOCARDIAL INFARCTION   Status: 

Acute   





(2) Bradycardia


Code(s): R00.1 - BRADYCARDIA, UNSPECIFIED   Status: Acute   





(3) Dyslipidemia


Code(s): E78.5 - HYPERLIPIDEMIA, UNSPECIFIED   Status: Chronic   





- Plan





* NSTEMI- post PTCA and STENT placement to the RCA.


* He is clinically stable


* He can be discharge home with close outpatient follow-up.

## 2020-04-25 NOTE — PDOC.CPN
- Subjective


Date: 04/25/20


Time: 13:30


Interval history: 





The pt seen and examined.  No overnight events.  No cardiac complaints.  





- Objective


Allergies/Adverse Reactions: 


 Allergies











Allergy/AdvReac Type Severity Reaction Status Date / Time


 


No Known Drug Allergies Allergy   Verified 04/21/20 06:51











Visit Medications: 


 Current Medications





Aspirin (Aspirin Chewable)  81 mg PO DAILY [facility]


   Last Admin: 04/25/20 07:40 Dose:  81 mg


Atorvastatin Calcium (Lipitor)  80 mg PO [facility]


   Last Admin: 04/24/20 20:25 Dose:  80 mg


Atropine Sulfate (Atropine)  0.5 mg IVP ASDIR PRN


   PRN Reason: Sustained Bradycardia HR < 30


Clopidogrel Bisulfate (Plavix)  75 mg PO DAILY [facility]


   Last Admin: 04/25/20 07:40 Dose:  75 mg


Morphine Sulfate (Morphine)  2 mg SLOW IVP Q4H PRN


   PRN Reason: Moderate to Severe Pain (6-10)


   Last Admin: 04/21/20 15:33 Dose:  2 mg


Nitroglycerin (Nitrostat)  0.4 mg SL Q5MIN PRN


   PRN Reason: Chest Pain


Pantoprazole Sodium (Protonix)  40 mg PO [facility]


   Last Admin: 04/24/20 20:25 Dose:  40 mg


Sodium Chloride (Flush - Normal Saline)  10 ml IVF Q12HR [facility]


   Last Admin: 04/25/20 07:40 Dose:  10 ml


Sodium Chloride (Flush - Normal Saline)  10 ml IVF PRN PRN


   PRN Reason: Saline Flush








Vital Signs & Weight: 


 Vital Signs











  Temp Pulse Pulse Pulse Resp BP BP


 


 04/25/20 09:45       


 


 04/25/20 09:13    58 L  52 L   126/67  128/67


 


 04/25/20 07:38  97.6 F  55 L    18  


 


 04/25/20 03:37  98.2 F  50 L    18  














  BP Pulse Ox Pulse Ox Pulse Ox


 


 04/25/20 09:45   95  


 


 04/25/20 09:13    98  99


 


 04/25/20 07:38  119/70  96  


 


 04/25/20 03:37  120/76  96  








 











Weight                         215 lb

















- Physical Exam


General: alert & oriented x3


HEENT: mucus membranes moist


Neck: supple neck


Cardiac: regular rate and rhythm, S1/S2


Lungs: clear to auscultation


Neuro: cranial nerve 2-12 intact


Extremities: no edema


Skin: clear


Musculoskeletal: normal range of motion





- Labs


Result Diagrams: 


 04/23/20 03:17





 04/24/20 07:09


 Troponin/CKMB











Troponin I  7.481 ng/mL (< 0.028)  H*  04/21/20  06:58    














- Telemetry


Sinus rhythms and dysrhythmias: sinus bradycardia (< 50 bpm)





- Assessment/Plan


Assessment/Plan: 





1. CAD with SIENNA x1 in RCA on 04/22/2020 - On ASA and Plavix; No on BBlcoker due 

to bradycardia; may start ACE/ARB with more stable VS


2. S/p 4 sec pauses prior to LHC and PTCA - HR has been upper 40s-50s; 

Asymptomatic; The pt will  30-day EVR at Dr JOHNSON's office on Monday


MAR reviewed





* From Cardiac standpoint, the pt is stable to d/c home.


* The pt will  30-day EVR at Dr JOHNSON's office on Monday


* the pt will f/u with Dr Adair's office in 2 wks.

## 2020-04-26 NOTE — DIS
DATE OF ADMISSION:  04/21/2020



DATE OF DISCHARGE:  04/25/2020



DISCHARGE DISPOSITION:  Home.



DISCHARGE DIAGNOSES:  

1. Non-ST elevation myocardial infarction.

2. Status post stent to the right coronary artery.

3. Dyslipidemia.

4. Tobacco abuse.

5. History of gastroesophageal reflux disease.



DISCHARGE MEDICATIONS:  Include,

1. Plavix 75 mg daily.

2. Aspirin 81 mg daily.

3. Lipitor 80 mg at bedtime.

4. Prilosec 20 mg a day.



CODE STATUS:  Full code.



ALLERGIES:  NO KNOWN DRUG ALLERGIES.



PROCEDURES DONE DURING ADMISSION:  The patient had a cardiac catheterization in

which the patient had a drug-eluting stent placed to the right coronary artery.  The

patient also had an echocardiogram, this demonstrated an ejection fraction estimated

at 45%.  There was some hypokinetic motion of the inferoseptal wall of the left

ventricle and moderately thickened aortic valve. 



HOSPITAL COURSE:  Mr. Mustafa is a pleasant 59-year-old gentleman, who was admitted

to the hospital after he had an episode of chest pain.  This had been off and on for

the past couple of weeks.  He was evaluated in the ER and was noted to have an

elevated troponin and his troponin peaked at 7.4.  He was evaluated by Cardiology.

He underwent cardiac catheterization, which demonstrated 100% occlusion of his right

coronary artery.  He underwent PTCA and stent placement.  He developed some

significant bradycardia, likely as a result of the ischemia from the RCA lesion.  He

was evaluated by electrophysiologist, Dr. Dorantes, who recommended that he have

continued monitoring and hopefully the further he gets out from the

revascularization that the bradycardia would resolve.  He also had some nonsustained

ventricular tachycardia in the mix with the bradycardia as well, but by the time of

discharge, these had significantly improved.  His heart rate was up to 57, still

bradycardic but much improved.  He was asymptomatic and he was also counseled on the

need to discontinue smoking and to follow up with his primary care physician in

approximately 1 to 2 weeks. 







Job ID:  [id number]

## 2020-04-27 NOTE — PQF
Don Mustafa TONI MD

[id number]                                                             

[id number]                             

                                   

CLINICAL DOCUMENTATION CLARIFICATION FORM:  POST DISCHARGE



Addendum to original discharge summary date:  __________________________________
____



Late entry note date:  _________________________________________________________
__











DATE:4/27/2020                                             ATTN: Jose Manuel La



Please exercise your independent, professional judgment in responding to the 
clarification form. 

Clinical indicators are provided on the bottom of this form for your review



Please check appropriate box(s):

[X  ] NSTEMI (MI type I)

[  ] NSTEMI due to Demand Ischemia (AMI Type II)

[  ] NSTEMI type II due to: (Please, specify condition)__________

[  ] Unable to determine





CLINICAL INDICATORS - SIGNS / SYMPTOMS / LABS

Laboratory 4/21  .3, Troponin I 3.713, 7.481

H&P p1 4/21 Dr Quiroz Present with chest pain that has been going on and 
off for the past 3 weeks

H&P p1 4/21 Dr Quiroz he reports dizziness and weakness for the past 3 
weeks, including episodes where he thought he might pass out

Consult p2 4/21 Dr Adair Elevated troponin with unstable Angina

Consult p1 4/22 Emergency cardiac cath revealed occlusion of his RCA

Consult p2 4/22 Acute coronary syndrome, emergency cardiac cath, significant 
bradycardia

Discharge summary p1 4/25 He develop some significant bradycardia likely as a 
result of ischemia from the RCA lesion



RISKS:

ED notes p1 4/21  High Cholesterol

H&P p1 4/21  Smokes about a pack a day

H&P p1 4/21  GERD

H&P p2 4/21  Hypokalemia

H&P p2 4/21  Family hx of CAD

PN p4 4/22  Obesity

Consult p1 4/24  NSVT

Cariod PN p3 4/25 - CAD

Discharge summary p1 4/25 - HLD





TREATMENTS:

MAR 4/21  IV Nitroglycerin 50 mg

MAR 4/21  Lipitor 80mg oral 

MAR 4/21  IVF NS 1L

MAR 4/22  Aspirin Chewable 81 mg

MAR 4/22  Plavix 300 mg oral

Cardiac Cath  LHC with SIENNA and Mechanical Thrombectomy

Cardiac Cath  Temporary pacemaker

Cardiology Consult 4/21  Eyad Wylie

H&P p2 4/21  Serial troponin

Collected 4/21 - Electrocardiogram











(This form is maintained as a part of the permanent medical record)

2015 [employer]  All Rights Reserved

[email or web address]    [phone]

                                                              



 



MTDD

## 2020-04-29 NOTE — EKG
Test Reason : 

Blood Pressure : ***/*** mmHG

Vent. Rate : 049 BPM     Atrial Rate : 049 BPM

   P-R Int : 240 ms          QRS Dur : 090 ms

    QT Int : 462 ms       P-R-T Axes : 048 046 230 degrees

   QTc Int : 417 ms

 

Marked sinus bradycardia with marked sinus arrhythmia with 1st degree A-V block

Possible Inferior infarct , age undetermined



No STEMI

Abnormal ECG

 

Confirmed by GOLDBERG M.D., ADRIANA ([id number]),  ROSA FOX ([id number]) on 4/29/2020 2:50:12 PM

 

Referred By:             Confirmed By:ADRIANA GOLDBERG M.D.

## 2020-05-12 ENCOUNTER — HOSPITAL ENCOUNTER (INPATIENT)
Dept: HOSPITAL 92 - ERS | Age: 60
LOS: 2 days | Discharge: HOME | DRG: 242 | End: 2020-05-14
Attending: INTERNAL MEDICINE | Admitting: INTERNAL MEDICINE
Payer: SELF-PAY

## 2020-05-12 VITALS — BODY MASS INDEX: 32.2 KG/M2

## 2020-05-12 DIAGNOSIS — Z79.82: ICD-10-CM

## 2020-05-12 DIAGNOSIS — Z87.891: ICD-10-CM

## 2020-05-12 DIAGNOSIS — I25.10: ICD-10-CM

## 2020-05-12 DIAGNOSIS — I47.2: ICD-10-CM

## 2020-05-12 DIAGNOSIS — K21.9: ICD-10-CM

## 2020-05-12 DIAGNOSIS — I49.5: Primary | ICD-10-CM

## 2020-05-12 DIAGNOSIS — Z79.02: ICD-10-CM

## 2020-05-12 DIAGNOSIS — I21.4: ICD-10-CM

## 2020-05-12 DIAGNOSIS — I42.8: ICD-10-CM

## 2020-05-12 DIAGNOSIS — Z95.5: ICD-10-CM

## 2020-05-12 DIAGNOSIS — I11.0: ICD-10-CM

## 2020-05-12 DIAGNOSIS — I50.22: ICD-10-CM

## 2020-05-12 DIAGNOSIS — E78.5: ICD-10-CM

## 2020-05-12 LAB
ALBUMIN SERPL BCG-MCNC: 4.1 G/DL (ref 3.5–5)
ALP SERPL-CCNC: 109 U/L (ref 40–110)
ALT SERPL W P-5'-P-CCNC: 22 U/L (ref 8–55)
ANION GAP SERPL CALC-SCNC: 13 MMOL/L (ref 10–20)
APTT PPP: 30.7 SEC (ref 22.9–36.1)
AST SERPL-CCNC: 14 U/L (ref 5–34)
BASOPHILS # BLD AUTO: 0.1 THOU/UL (ref 0–0.2)
BASOPHILS NFR BLD AUTO: 0.9 % (ref 0–1)
BILIRUB SERPL-MCNC: 0.5 MG/DL (ref 0.2–1.2)
BUN SERPL-MCNC: 7 MG/DL (ref 8.4–25.7)
CALCIUM SERPL-MCNC: 9.6 MG/DL (ref 7.8–10.44)
CHLORIDE SERPL-SCNC: 107 MMOL/L (ref 98–107)
CK MB SERPL-MCNC: 2.4 NG/ML (ref 0–6.6)
CK SERPL-CCNC: 120 U/L (ref 30–200)
CO2 SERPL-SCNC: 27 MMOL/L (ref 22–29)
CREAT CL PREDICTED SERPL C-G-VRATE: 0 ML/MIN (ref 70–130)
EOSINOPHIL # BLD AUTO: 0.4 THOU/UL (ref 0–0.7)
EOSINOPHIL NFR BLD AUTO: 5.7 % (ref 0–10)
GLOBULIN SER CALC-MCNC: 2.8 G/DL (ref 2.4–3.5)
GLUCOSE SERPL-MCNC: 105 MG/DL (ref 70–105)
HGB BLD-MCNC: 15.4 G/DL (ref 14–18)
INR PPP: 1
LYMPHOCYTES # BLD: 2.2 THOU/UL (ref 1.2–3.4)
LYMPHOCYTES NFR BLD AUTO: 28.9 % (ref 21–51)
MAGNESIUM SERPL-MCNC: 2.1 MG/DL (ref 1.6–2.6)
MCH RBC QN AUTO: 32.3 PG (ref 27–31)
MCV RBC AUTO: 96.1 FL (ref 78–98)
MONOCYTES # BLD AUTO: 0.8 THOU/UL (ref 0.11–0.59)
MONOCYTES NFR BLD AUTO: 10.4 % (ref 0–10)
NEUTROPHILS # BLD AUTO: 4.2 THOU/UL (ref 1.4–6.5)
NEUTROPHILS NFR BLD AUTO: 54.1 % (ref 42–75)
PLATELET # BLD AUTO: 256 THOU/UL (ref 130–400)
POTASSIUM SERPL-SCNC: 3.6 MMOL/L (ref 3.5–5.1)
PROTHROMBIN TIME: 12.9 SEC (ref 12–14.7)
RBC # BLD AUTO: 4.77 MILL/UL (ref 4.7–6.1)
SODIUM SERPL-SCNC: 143 MMOL/L (ref 136–145)
TROPONIN I SERPL DL<=0.01 NG/ML-MCNC: 0.03 NG/ML (ref ?–0.03)
TROPONIN I SERPL DL<=0.01 NG/ML-MCNC: 0.04 NG/ML (ref ?–0.03)
WBC # BLD AUTO: 7.7 THOU/UL (ref 4.8–10.8)

## 2020-05-12 PROCEDURE — 71045 X-RAY EXAM CHEST 1 VIEW: CPT

## 2020-05-12 PROCEDURE — 80048 BASIC METABOLIC PNL TOTAL CA: CPT

## 2020-05-12 PROCEDURE — 93798 PHYS/QHP OP CAR RHAB W/ECG: CPT

## 2020-05-12 PROCEDURE — 93620 COMP EP EVL R AT VEN PAC&REC: CPT

## 2020-05-12 PROCEDURE — 82553 CREATINE MB FRACTION: CPT

## 2020-05-12 PROCEDURE — 85025 COMPLETE CBC W/AUTO DIFF WBC: CPT

## 2020-05-12 PROCEDURE — 93005 ELECTROCARDIOGRAM TRACING: CPT

## 2020-05-12 PROCEDURE — C1898 LEAD, PMKR, OTHER THAN TRANS: HCPCS

## 2020-05-12 PROCEDURE — C1785 PMKR, DUAL, RATE-RESP: HCPCS

## 2020-05-12 PROCEDURE — 36415 COLL VENOUS BLD VENIPUNCTURE: CPT

## 2020-05-12 PROCEDURE — 93306 TTE W/DOPPLER COMPLETE: CPT

## 2020-05-12 PROCEDURE — 83735 ASSAY OF MAGNESIUM: CPT

## 2020-05-12 PROCEDURE — C1769 GUIDE WIRE: HCPCS

## 2020-05-12 PROCEDURE — C1730 CATH, EP, 19 OR FEW ELECT: HCPCS

## 2020-05-12 PROCEDURE — 85610 PROTHROMBIN TIME: CPT

## 2020-05-12 PROCEDURE — 85730 THROMBOPLASTIN TIME PARTIAL: CPT

## 2020-05-12 PROCEDURE — 80053 COMPREHEN METABOLIC PANEL: CPT

## 2020-05-12 PROCEDURE — 76942 ECHO GUIDE FOR BIOPSY: CPT

## 2020-05-12 PROCEDURE — 84484 ASSAY OF TROPONIN QUANT: CPT

## 2020-05-12 PROCEDURE — 33208 INSRT HEART PM ATRIAL & VENT: CPT

## 2020-05-12 PROCEDURE — 82550 ASSAY OF CK (CPK): CPT

## 2020-05-12 NOTE — RAD
PORTABLE CHEST 1 VIEW:

 

DATE: 5/12/2020.

TIME: 12:04 PM.

 

HISTORY: 

Dizziness, shortness of breath.

 

COMPARISON: 

4/21/2020.

 

FINDINGS: 

The heart size is borderline.  The lungs are expanded without lobar consolidation, pneumothoraces, fr
ank pulmonary edema, or pleural effusions.

 

IMPRESSION: 

No acute process.

 

POS: ISAIAS

## 2020-05-12 NOTE — HP
PRIMARY CARE PHYSICIAN:  Out of town physician.



CHIEF COMPLAINT:  Feeling dizzy and lightheaded.



HISTORY OF PRESENT ILLNESS:  Mr. Mustafa is a very pleasant 59-year-old gentleman,

who has history of coronary artery disease.  He was recently discharged from our

facility after suffering a non ST-segment elevated MI with 100% occlusion of his

right coronary artery.  He is status post drug-eluting stent to the RCA.  He also at

that time had significant bradycardia and also had some wide-complex

tachyarrhythmias as well.  He was evaluated by electrophysiologist and felt that

since it was so soon after the MI, that they would try medical management first and

re-evaluate him for the arrhythmias at a later time as this could be related to

reperfusion rhythm.  The patient said he went home and was feeling fine up until

today.  At 11 o'clock, he started feeling dizzy and lightheaded.  He says he was

just sitting in a chair when it happened and he also felt a little bit tired.  He

says he went out for a walk with his wife and noticed that his heart rate did not go

up as expected and so they took his blood pressure and noticed that his heart rate

was very slow.  He says that sometimes it was as low as 30 and as a result, he felt

he should come to the ER for evaluation.  In the ER, he was found to be bradycardic

and also had a slightly elevated troponin and is being admitted for further

evaluation.  The patient denies any other symptoms such as chest pain.  He denies

any nausea, vomiting, no diaphoresis.  He does admit to feeling a little bit short

of breath off and on.  He denies any leg pain or leg swelling and he says he has

been compliant with his medications. 



REVIEW OF SYSTEMS:  All systems were reviewed and are negative except for that

mentioned in the history of present illness. 



PAST MEDICAL HISTORY:  Significant for chronic systolic heart failure, coronary

artery disease status post stent, and gastroesophageal reflux disease, former

tobacco abuse. 



PAST SURGICAL HISTORY:  He has had the stent to the right coronary artery.



ALLERGIES:  NO KNOWN DRUG ALLERGIES.



SOCIAL HISTORY:  He is .  He is a former smoker.  He quit he says on  when he was last in the hospital.  He smoked a pack a day for 35 years prior to

that.  Denies any alcohol use.  He has 3 children of his own and his wife is his

surrogate decision maker. 



FAMILY HISTORY:  Significant for coronary artery disease in both his mom and his

father and his father  in his 50s with this and also diabetes in both parents. 



CURRENT MEDICATIONS:  Include:

1. Plavix 75 mg daily.

2. Aspirin 81 mg daily.

3. Prilosec 20 mg daily.

4. Lipitor 80 mg at bedtime.



PHYSICAL EXAMINATION:

GENERAL:  He is alert and oriented.  He appears to be in no acute distress.  He is

well developed and well nourished. 

VITAL SIGNS:  His blood pressure was 128/78, heart rate ranged from 34 to 63,

respiratory rate of 17, temperature is 98.6, and O2 saturation is 97% on room air. 

HEENT:  Pupils are equal, round, and reactive.  Extraocular muscles are intact.

Sclerae are anicteric.  Throat, no erythema, no exudates. 

NECK:  No adenopathy.  No bruits. 

LUNGS:  Clear to auscultation.  There is no wheezing, no rales, no rhonchi. 

CARDIOVASCULAR:  He has a normal S1 and S2.  There is no S3 or S4.  No murmurs,

clicks, or rubs. 

ABDOMEN:  Soft, nontender, and nondistended.  Positive for bowel sounds.  No

rebound.  No guarding.  No organomegaly. 

EXTREMITIES:  There is no clubbing, cyanosis, no edema. 

NEUROLOGICAL:  Grossly intact.  No muscle weakness. 

SKIN and INTEGUMENT:  No skin changes.  No rash.



LABORATORY DATA:  White blood cell count 7.7, hemoglobin 15.4, hematocrit is 45.8,

and platelet count is 257.  INR is 1.0.  Sodium 143, potassium 3.6, chloride is 107,

CO2 is 27, BUN of 7, creatinine 0.99, glucose is 105.  EKG; sinus rhythm at the time

of the EKG.  The rate was 65.  He had some T-wave inversions in V2 and V3, as well

as V4 and also in lead I, Q-waves, lead III, and aVF.  This is by my reading and

also frequent PVCs.  Chest x-ray, there is no infiltrate or effusion.  No

significant airspace disease. 



ASSESSMENT:  This is a pleasant 59-year-old gentleman, who presents with symptomatic

bradycardia.  This in the setting of suffering a recent non ST-elevation myocardial

infarction to the right coronary artery distribution.  He will be placed in

observation.  We will have transcutaneous pacers at the bedside.  Consult

Cardiology.  We will hold any medications that would affect the AV node and he will

be placed on deep venous thrombosis and gastrointestinal prophylaxis.  We will

continue aspirin and Plavix, and further recommendations to follow. 







Job ID:  [id number]

## 2020-05-13 LAB
ANION GAP SERPL CALC-SCNC: 11 MMOL/L (ref 10–20)
BASOPHILS # BLD AUTO: 0 THOU/UL (ref 0–0.2)
BASOPHILS NFR BLD AUTO: 0.7 % (ref 0–1)
BUN SERPL-MCNC: 10 MG/DL (ref 8.4–25.7)
CALCIUM SERPL-MCNC: 8.4 MG/DL (ref 7.8–10.44)
CHLORIDE SERPL-SCNC: 108 MMOL/L (ref 98–107)
CO2 SERPL-SCNC: 25 MMOL/L (ref 22–29)
CREAT CL PREDICTED SERPL C-G-VRATE: 121 ML/MIN (ref 70–130)
EOSINOPHIL # BLD AUTO: 0.3 THOU/UL (ref 0–0.7)
EOSINOPHIL NFR BLD AUTO: 4.6 % (ref 0–10)
GLUCOSE SERPL-MCNC: 100 MG/DL (ref 70–105)
HGB BLD-MCNC: 13.9 G/DL (ref 14–18)
LYMPHOCYTES # BLD: 1.7 THOU/UL (ref 1.2–3.4)
LYMPHOCYTES NFR BLD AUTO: 26.2 % (ref 21–51)
MCH RBC QN AUTO: 31.9 PG (ref 27–31)
MCV RBC AUTO: 96.3 FL (ref 78–98)
MONOCYTES # BLD AUTO: 0.6 THOU/UL (ref 0.11–0.59)
MONOCYTES NFR BLD AUTO: 8.8 % (ref 0–10)
NEUTROPHILS # BLD AUTO: 4 THOU/UL (ref 1.4–6.5)
NEUTROPHILS NFR BLD AUTO: 59.6 % (ref 42–75)
PLATELET # BLD AUTO: 216 THOU/UL (ref 130–400)
POTASSIUM SERPL-SCNC: 3.6 MMOL/L (ref 3.5–5.1)
RBC # BLD AUTO: 4.36 MILL/UL (ref 4.7–6.1)
SODIUM SERPL-SCNC: 140 MMOL/L (ref 136–145)
WBC # BLD AUTO: 6.6 THOU/UL (ref 4.8–10.8)

## 2020-05-13 PROCEDURE — 02HK3JZ INSERTION OF PACEMAKER LEAD INTO RIGHT VENTRICLE, PERCUTANEOUS APPROACH: ICD-10-PCS | Performed by: INTERNAL MEDICINE

## 2020-05-13 PROCEDURE — 0JH606Z INSERTION OF PACEMAKER, DUAL CHAMBER INTO CHEST SUBCUTANEOUS TISSUE AND FASCIA, OPEN APPROACH: ICD-10-PCS | Performed by: INTERNAL MEDICINE

## 2020-05-13 PROCEDURE — 02K83ZZ MAP CONDUCTION MECHANISM, PERCUTANEOUS APPROACH: ICD-10-PCS | Performed by: INTERNAL MEDICINE

## 2020-05-13 PROCEDURE — 4A023FZ MEASUREMENT OF CARDIAC RHYTHM, PERCUTANEOUS APPROACH: ICD-10-PCS | Performed by: INTERNAL MEDICINE

## 2020-05-13 PROCEDURE — 4A0234Z MEASUREMENT OF CARDIAC ELECTRICAL ACTIVITY, PERCUTANEOUS APPROACH: ICD-10-PCS | Performed by: INTERNAL MEDICINE

## 2020-05-13 PROCEDURE — 02H63JZ INSERTION OF PACEMAKER LEAD INTO RIGHT ATRIUM, PERCUTANEOUS APPROACH: ICD-10-PCS | Performed by: INTERNAL MEDICINE

## 2020-05-13 RX ADMIN — CEFAZOLIN SODIUM SCH MLS: 1 SOLUTION INTRAVENOUS at 20:22

## 2020-05-13 NOTE — PDOC.HOSPP
- Subjective


Encounter Date: 05/13/20


Encounter Time: 09:15


Subjective: 





Mr Mustafa is seen as a follow up this morning for bradycardia  and dizziness. 

He denies dizziness this morning and states he is feeling well. EP has seen him 

this morning and instructed him to walk in the hallways to see if heart rate 

increases. He is currently NPO if they decide any procedures are necessary. 





- Objective


Vital Signs & Weight: 


 Vital Signs (12 hours)











  Temp Pulse Resp BP BP Pulse Ox


 


 05/13/20 08:35  96.5 F L  56 L  16  138/85   96


 


 05/13/20 06:47       97


 


 05/13/20 03:29  97.5 F L  66  18   130/77  97








 Weight











Weight                         205 lb 11.2 oz














I&O: 


 











 05/12/20 05/13/20 05/14/20





 06:59 06:59 06:59


 


Intake Total  790 


 


Balance  790 











Result Diagrams: 


 05/13/20 04:09





 05/13/20 04:09


EKG Reviewed by me: Yes (SB/ SR 50-60s)





Hospitalist ROS





- Medication


Medications: 


Active Medications











Generic Name Dose Route Start Last Admin





  Trade Name Freq  PRN Reason Stop Dose Admin


 


Famotidine  20 mg  05/12/20 21:00  05/13/20 08:42





  Pepcid  PO   20 mg





  BID CHICHI   Administration





     





     





     





     


 


Sodium Chloride  1,000 mls @ 50 mls/hr  05/12/20 18:13  05/12/20 19:32





  Normal Saline 0.9%  IV   1,000 mls





  .Q20H CHICHI   Administration





     





     





     





     














- Exam


General Appearance: NAD, awake alert


ENT: moist mucosa


Neck: supple, symmetric, no JVD, no lymphadenopathy


Heart: RRR, no murmur, no gallops, no rubs


Respiratory: CTAB, no wheezes, no rales, no ronchi


Gastrointestinal: soft, non-tender, non-distended, normal bowel sounds


Extremities: no cyanosis, no edema


Psychiatric: normal affect, normal behavior





Hosp A/P


(1) Bradycardia


Code(s): R00.1 - BRADYCARDIA, UNSPECIFIED   Status: Acute   





(2) CAD (coronary artery disease)


Code(s): I25.10 - ATHSCL HEART DISEASE OF NATIVE CORONARY ARTERY W/O ANG PCTRS 

  Status: Acute   





(3) Dyslipidemia


Code(s): E78.5 - HYPERLIPIDEMIA, UNSPECIFIED   Status: Chronic   





(4) HTN (hypertension)


Code(s): I10 - ESSENTIAL (PRIMARY) HYPERTENSION   Status: Chronic   





- Plan





Bradycardia: pt to walk in hallway today to see if heart rate rises, possible 

PM if it continues to stay low and he is symptomatic, EP and cardiology 

following


CAD: continue plavix and ASA


HTN: BP stable


Dyslipidemia: continue Lipitor

## 2020-05-13 NOTE — CON
DATE OF CONSULTATION:  05/12/2020



REASON FOR CONSULTATION:  Bradycardia.



HISTORY OF PRESENT ILLNESS:  Mr. Walsh is a 59-year-old gentleman, who recently

presented to [place] with acute myocardial infarction.  He had complete occlusion

of the right coronary artery.  He underwent successful stent placement.  Prior to

discharge, he did have episode of nonsustained VT.  He was also bradycardic and did

have a temporary pacemaker noted just after his initial infarct.  This has been

removed.  Beta-blocker therapy could not be started due to bradycardia.  His event

recorder was placed, it did suggest significant symptomatic bradycardia in addition

to nonsustained VT.  He was subsequently admitted. 



PAST MEDICAL HISTORY:  As above including acid reflux, previous tobacco abuse.



ALLERGIES:  NONE.



HOME MEDICATIONS:  Plavix, aspirin, Prilosec, Lipitor.



SOCIAL HISTORY:  He is currently .



ALLERGIES:  NONE.



REVIEW OF SYSTEMS:  A 10-point review of systems is reviewed as above, otherwise

negative. 



PHYSICAL EXAMINATION:

VITAL SIGNS:  Blood pressure __________, temperature 96.8. 

GENERAL:  Patient is a pleasant male, who is in no acute distress.  The patient

appears their stated age. 

NEUROLOGIC:  The patient is alert and oriented x3 with no focal neurologic deficits. 

HEENT:  Sclerae without icterus.  Mouth has moist mucous membranes with normal

pallor. 

NECK:  No JVD.  Carotid upstroke brisk.  No bruits bilaterally. 

LUNGS:  Clear to auscultation with unlabored respirations. 

BACK:  No scoliosis or kyphosis. 

CARDIAC:  Regular rate and rhythm with normal S1 and S2.  No S3 or S4 noted.  No

significant rubs, murmurs, thrills, or gallops noted throughout the precordium.  PMI

is not displaced.  There is no parasternal heave. 

ABDOMEN:  Soft, nontender, nondistended.  No peritoneal signs present.  No

hepatosplenomegaly.  No abnormal striae. 

EXTREMITIES:  2+ femoral and 2+ dorsalis pedis pulses.  No cyanosis, clubbing, or

edema. 

SKIN:  No gross abnormalities.



PERTINENT LABORATORY DATA:  Hemoglobin 13.9.  Creatinine 0.87.  Peak troponin 0.04.



IMPRESSION:  

1. Symptomatic bradycardia.

2. Nonsustained ventricular tachycardia.



RECOMMENDATIONS:  The patient has no current symptoms suggesting angina.  We will

recommend repeating his echo.  We will also recommend EP consultation due to

bradycardia and nonsustained VT.  May need an EP study. 







Job ID:  [id number]

## 2020-05-13 NOTE — CON
DATE OF CONSULTATION:  05/13/2020



This is Mikki Miller NP dictating a report for Fred Dorantes MD.



TIME SEEN:  8:00 a.m.



REASON FOR CONSULTATION:  Wide-complex tachycardia, bradycardia.



HISTORY OF PRESENT ILLNESS:  Mr. Mustafa is a 59-year-old gentleman, recently

evaluated by our service as an inpatient at [facility] at the end of April when 
he

was admitted for chest pain and bradycardia, and was found to have 100% RCA

occlusion.  He was subsequently stented and required transient pacemaker 
support.

His bradycardia has largely resolved and is asymptomatic.  He did have a 
documented

nonsustained ventricular tachycardia runs during hospitalization which was 
likely 

related to his recent coronary reperfusion.  We were unable to start beta-
blocker therapy due

to his underlying bradycardia.  He was discharged with an event monitor, which

recently showed wide-complex tachycardia runs.  He had associated symptoms of

dizziness and lightheadedness.  He was directed to the ER where he was found to 
be

bradycardic with premature ventricular complexes noted as well.  His heart rate

sometimes was 30 beats per minute and he does endorse symptoms of 
lightheadedness

and dizziness with those episodes.  He also reports some shortness of breath on 
and

off.  EP consultation is requested for his arrhythmia management both with

symptomatic bradycardia, PVCs, and nonsustained VT runs that have been seen on 
an

outpatient monitor he is wearing. 



REVIEW OF SYSTEMS:  A 12-point review of systems is negative except that listed

above in HPI. 



PAST MEDICAL HISTORY:  

1. GERD.

2. Coronary artery disease with 100% occluded RCA status post stenting in 04/
2020.

3. Ischemic cardiomyopathy with mildly reduced ejection fraction of 40% to 45%, 
now

recovered to normal. 

4. Nonsustained ventricular tachycardia.

5. Bradycardia.

6. Idioventricular bradycardic arrhythmias.



ALLERGIES:  NO KNOWN DRUG ALLERGIES.



HOME MEDICATIONS:  Include:

1. Plavix 75 mg daily.

2. Aspirin 81 mg daily.

3. Prilosec 20 mg daily.

4. Lipitor 80 mg nightly.



FAMILY HISTORY:  Positive for CAD with mom and dad.  Father passed away in his 
50s.

Both parents had diabetes as well. 



SOCIAL HISTORY:  .  Former smoker who quit in April of this year, but 
had a

35-pack-year history prior to that.  Denies alcohol use.  Has 3 children. 



OBJECTIVE:  VITAL SIGNS:  Temperature 97.5, pulse 56, respirations 16, oxygen 
is 96%

on room air, blood pressure 138/85, and respirations 14. 

GENERAL:  The patient is alert and oriented.  Speech is clear.  Affect is

appropriate.  He is in no apparent distress at the time of the exam. 

HEENT:  The patient is normocephalic and atraumatic.  Sclerae anicteric.  EOMs 
are

intact.  Oral mucosa is moist and pink with adequate dentition. 

NECK:  Supple without lymphadenopathy.  Trachea is midline. 

PULMONARY:  Lungs are clear to auscultation bilaterally.  Respirations even and

unlabored with good bilateral excursion. 

CARDIOVASCULAR:  Heart rate is slow and regular with crisp S1 and S2.  PMI

nondisplaced.  No significant murmur, rub, or gallop is appreciated. 

GI:  Abdomen is soft and nontender to touch without masses.  Hepatojugular 
reflux is

negative. 

EXTREMITIES:  Warm and dry to touch without clubbing, cyanosis, or edema. 

NEUROLOGIC:  Grossly intact and nonfocal. 

MUSCULOSKELETAL:  Denies joint pain.  Gait is stable without assistance.



DATABASE:  LABORATORY DATA:  Hematology was unremarkable.  Chemistry:  
Potassium 3.6

and creatinine 0.87.  Troponin peaked at 0.04.  Magnesium 2.1. 



CARDIOVASCULAR STUDIES:  Echocardiogram on 04/24/2020 shows EF 45%.  Repeat echo

since admission has not been interpreted, but images were reviewed by myself 
and the

ejection fraction appears to have normalized with no significant reduction.

Telemetry and EKG showed sinus bradycardia.  External event monitor reports were

reviewed and it shows nonsustained wide-complex tachycardia run, suggestive of

ventricular tachycardia. 



IMPRESSION:  

1. Wide-complex tachycardia, seen on external monitor, suggestive of ventricular

tachycardia, symptomatic with lightheadedness and dizziness. 

2. Symptomatic bradycardia, sick sinus syndrome.

3. Recent non-ST-elevation myocardial infarction with reperfusion by 100% 
occluded

right coronary artery in late 04/2020. 

4. Nonischemic cardiomyopathy with recovered ejection fraction, previously 45% 
in

the setting of a non-ST-elevation myocardial infarction. 

5. Premature ventricular complexes.



PLAN AND RECOMMENDATIONS:  Mr. Mustafa is a pleasant 59-year-old gentleman.  I 
have

reviewed his external monitor reports and have seen the wide-complex 
tachycardia run

that is suggestive of ventricular tachycardia.  He had been seen to have some

nonsustained VT runs immediately following his stenting of the RCA last month.  
We

are unable to place him on any beta-blocker therapy due to his underlying

bradycardia at that time.  He was discharged on monitor in hopes that his 
arrhythmia

issues were largely reperfusion related.  Nevertheless he continues to have 
symptomatic

bradycardia and nonsustained VT runs in addition to premature ventricular 
complexes.

 At this point, given his symptoms, I feel there is an indication for pacemaker 
to

address his bradycardia.  In light of his nonsustained VT, I would like to do an

Electrophysiology study as well, and if inducible, I would recommend a dual-
chamber

ICD instead of a simple pacemaker alone.  This was discussed with the patient at

length including treatment options.  The pacing support would also allow for the

medical management of his PVCs with his ventricular arrhythmias with beta-
blocker

therapy or alternate agents.  We discussed the risks, benefits, and alternatives
,

risks with the EP study include hematoma, bleeding at the groin sites, abnormal

heart rhythms, risks with ablation include pericardial effusion, tamponade, and

arrhythmia recurrence, risks with pacemaker/ICD implant include pain, bleeding,

bruising, infection at the implant site, pneumothorax, pericardial effusion, 
lead

dislodgement, device malfunction, possible recalls.  The patient voices

understanding and is willing to proceed.  He is n.p.o. and I will try to make

arrangements for later today. 



Thank you for allowing me to participate in the care of this patient.







Job ID:  [id number]



MTDD

## 2020-05-13 NOTE — OP
DATE OF PROCEDURE:  05/13/2020



PROCEDURE PERFORMED:  Electrophysiology study.



PHYSICIANS:  

1. Jose Manuel Jacques MD.

2. Eyad Adair MD.



REASON FOR PROCEDURE:  Mr. Mustafa is a 59-year-old man with prior history of

myocardial infarction, RCA stenting, especially with marked bradycardia, 
temporary

pacemaker placement which was eventually removed with moderate improvement of 
his

 bradycardia. He was discharged off beta-blocker and persisting marked 
bradycardia and 

nonsustained ventricular tachycardia episodes were seen and he was monitored on 
the home 

monitor. Subsequently, recurrent nonsustained ventricular tachycardia episodes 
are seen on

his home monitor and also marked bradycardia.  He is returning with marked 
fatigue

and tiredness as EKGs revealing sinus bradycardia and episodes of frequent PVCs 
with

bigeminy.  He is here for EP study and evaluation of inducibility of ventricular

tachycardia as well as sinus node function and PVCs location. 



DESCRIPTION OF PROCEDURE:  The patient received deep sedation by Anesthesia

specialist.  The right femoral venous area was prepped, draped, and anesthetized

with subcutaneous lidocaine.  Under ultrasound guidance, a 6-[place] short 
sheath was

introduced.  Through this, a decapolar catheter was advanced to the right atrium
,

right ventricle, His bundle, CS position.  Pacing recording and mapping was

performed from each location.  The following findings were noted.  Baseline 
rhythm

was sinus rhythm with frequent PVCs, occasional trigeminy seen.  The bigeminal 
PVCs

are left bundle inferior axis in morphology with retention in V1 and V2.  The

baseline cycle length 734, ERP 71, .  The RR interval was 900 msec.  The 
HV

was 40 msec,  msec.   msec,  msec,  msec. 



The burst atrial pacing reveals AV Wenckebach cycle length was at 420 msec.

Antegrade cycle length 420 msec.  Retrograde VA cycle length was 610 msec.  The 
AV

jennifer ERP was 600/320 msec.  Ventricular extrastimuli testing was performed with

ventricular ERP at 600/260 msec.  Additional extrastimuli were also delivered 
and

decremented to the refractory period.  Up to 3 ventricular extrastimuli both at 
600

and 400 msec drive train.  Short nonsustained ventricular tachycardia was seen.

Mapping of the baseline by doing PVCs were performed, but no early activation is

seen in the right ventricle to precede the QRS.  Earliest activation was seen 
at the

basal outflow tract area. 



At the end of the case, we proceeded with a dual-chamber pacemaker implant.  The

left subclavian vein was checked for patency of the EP catheter.  Please see

separate report.  Hemostasis was eventually obtained with manual pressure. 



CONCLUSION:  

1. Abnormal sinus node function with corrected sinus node recovery time is 671 
msec.

 The frequent bigeminy PVCs were seen, which were somewhat suppressed loop of 
atrial

overdrive pacing. 

2. Normal AV jennifer and sinus jennifer function.

3. Dual AV jennifer physiology present.

4. No inducible atrial arrhythmias noted with burst atrial pacing.

5. Only nonsustained ventricular tachycardia seen with ventricular access to my

testing. 



PLAN:  Proceed with pacemaker and maximize beta-blocker therapy.







Job ID:  [id number]



[facility]

## 2020-05-13 NOTE — RAD
EXAM: 

CHEST ONE VIEW



HISTORY:

AP study with pacemaker placement.



COMPARISON:

5/12/2020



FINDINGS:

There has been interval placement of dual lead left subclavian cardiac pacemaking device with RA and 
RV leads. Cardiac silhouette is magnified by projection. Pulmonary vasculature is within normal

limits. Mild elevation right hemidiaphragm is present. Lungs otherwise appear clear. No pneumothorax 
or pleural effusion is identified. Electronic device again overlies the central mediastinum. No

other interval change. 



IMPRESSION:

Interval placement of a dual-lead left subclavian cardiac pacemaking device without evidence of a pne
umothorax.



Reported By: Kolby Bravo 

Electronically Signed:  5/13/2020 3:21 PM

## 2020-05-14 VITALS — TEMPERATURE: 96.5 F

## 2020-05-14 VITALS — DIASTOLIC BLOOD PRESSURE: 72 MMHG | SYSTOLIC BLOOD PRESSURE: 132 MMHG

## 2020-05-14 RX ADMIN — CEFAZOLIN SODIUM SCH MLS: 1 SOLUTION INTRAVENOUS at 04:50

## 2020-05-14 NOTE — PDOC.EP
- Subjective


Date: 05/14/20


Time: 08:00


Interval History: 





 follow-up for symptomatic bradycardia and wide complex tachycardia.  Status 

post electrophysiology study on 05/13/2020.  He was not inducible for sustained 

ventricular tachycardia.  He did not qualify for ICD implant.  Due to his 

symptomatic bradycardia a dual-chamber pacemaker was implanted.  today he is 

feeling well having minor discomfort at the implant site is heart rates have 

been stable he is eager to discharge home and voices no cardiac concerns or 

complaints to me this morning.





- Review of Systems


Constitutional: denies: chills, fever, malaise, sweats, weakness, other


Respiratory: denies: cough, dry, hemoptysis, pleuritic pain, shortness of breath

, SOB with excertion, sputum, wheezing


Cardiology: denies: chest pain, edema, heart racing, light headedness, orthopnea

, paroxysmal noc. dyspnea, palpitations, passing out, pleuritic pain, pressure, 

swelling


Gastrointestinal: denies: abdominal pain, constipation, diarrhea, hematochezia, 

melena, nausea, vomitting





- Objective


Allergies/Adverse Reactions: 


 Allergies











Allergy/AdvReac Type Severity Reaction Status Date / Time


 


No Known Drug Allergies Allergy   Verified 04/21/20 06:51











Vital Signs & Weight: 


 Vital Signs











  Temp Pulse Resp BP Pulse Ox


 


 05/14/20 12:00   75  16  132/72 


 


 05/14/20 08:20  96.5 F L  74  16  129/71  96


 


 05/14/20 08:00      96


 


 05/14/20 07:05      99








 











Weight                         205 lb 11.2 oz














I/O: 


 I/O











 05/13/20 05/14/20 05/15/20





 06:59 06:59 06:59


 


Intake Total 790 1630 


 


Output Total  1500 


 


Balance 790 130 














- Physical Exam


General: alert & oriented x3, appears well, no apparent distress, speech clear, 

affect appropriate


HEENT: mucus membranes moist, normocephaly


Neck: supple neck, midline trachea, no JVD/HJR, no masses, no bruit, no 

lymphadenopathy, no thromegaly


Cardiology: regular rate and rhythm, no murmur, regular rate, regular rhythm, 

PMI nondisplaced


Lungs: clear to auscultation, normal breath sounds, normal exam, no wheeze, 

rales, rhonchi, no wheezes, no rales, no rhonchi


Neurology: cranial nerve 2-12 intact, grossly intact, no lateralizing findings





- Labs


Result Diagrams: 


 05/13/20 04:09





 05/13/20 04:09





- EKG Interpretation


EKG Method: Telemetry


EKG shows: Sinus rhythm





- Device


Device: dual, pacemaker


Device Result: [employer]





- Assessment/Plan


Assessment/Plan: 





1. Wide-complex tachycardia, seen on external monitor, 


2. Symptomatic bradycardia, sick sinus syndrome.


3. Recent non-ST-elevation myocardial infarction with reperfusion by 100% 

occluded


right coronary artery in late 04/2020. 


4. Nonischemic cardiomyopathy with recovered ejection fraction, previously 45% 

in


the setting of a non-ST-elevation myocardial infarction. 


5. Premature ventricular complexes.





EP STUDY CONCLUSION:  


1. Abnormal sinus node function with corrected sinus node recovery time is 671 

msec.


 The frequent bigeminy PVCs were seen, which were somewhat suppressed loop of 

atrial


overdrive pacing. 


2. Normal AV jennifer and sinus jennifer function.


3. Dual AV jennifer physiology present.


4. No inducible atrial arrhythmias noted with burst atrial pacing.


5. Only nonsustained ventricular tachycardia seen with ventricular access to my


testing. 





 dual chamber pacemaker was implanted following the EP study given the sinus 

node dysfunction and symptomatic bradycardia.  This will allow for medical 

management of his nonsustained ventricular tachycardia with beta-blocker 

therapy which has already been initiated.  His chest x-ray is stable today as 

is his pacemaker check.  He is cleared for discharge by EP.  I will see him 

back in the clinic for wound and device check in 2 weeks will contact our 

office with any concerns post EP study and pacemaker implant.  He will need to 

continue the prescribed antibiotics for 7 days upon discharge

## 2020-05-14 NOTE — DIS
DATE OF ADMISSION:  05/12/2020



DATE OF DISCHARGE:  05/14/2020



DISCHARGE DISPOSITION AND FOLLOWUP:  The patient discharged home and to follow 
up

with his primary care physician within 7 days, Dr. Eyad Adair in 7 days, 
and

Dr. Dorantes in 10 to 14 days. He was seen and examined on the day of discharge. 
Denies any new complaints. 



INPATIENT CONSULTS:  Cardiology and EP.



CLINICAL COURSE:  The patient is a very pleasant 59-year-old male with a 
history of

coronary artery disease.  He recently had a STEMI, where a drug-eluting stent 
was

placed in his RCA after it was found he had a 100% occlusion.  At that time, he 
had

significant bradycardia and also some wide-complex tachycardia.  Cardiology 
attempted to

medically manage him at that time.  On day of admission, he started feeling

dizzy and lightheaded while walking with his wife.  When he checked his pulse, 
it was as low as 30.  In the ER,

they found that he was bradycardic and also had slightly elevated troponin.

Cardiology and EP were both consulted for him.  He underwent EP study that was

negative for any V-tach.  He had a dual-chamber pacemaker implanted without any

issues.  The following day, he was able to be discharged as he was asymptomatic 
and

feeling better. 



FINAL DIAGNOSES:  

1. Symptomatic bradycardia.

2. Ventricular tachycardia.

3. Recent myocardial infarction.

4. Status post pacemaker insertion.



DISCHARGE MEDICATIONS:  

New medications:

1. Keflex 500 mg p.o. four times a day x1 week.

2. metoprolol tartrate 25 mg p.o. b.i.d. 

The patient is to continue;

1. Prilosec 20 mg p.o. daily.

2. Aspirin 81 mg p.o. daily.

3. Lipitor 80 mg p.o. at bedtime.

4. Plavix 75 mg p.o. daily.



DISCHARGE INSTRUCTIONS:  The patient was instructed over post pacemaker 
insertion

activity restrictions and to follow up with his primary physician, Cardiology, 
and

EP.  He was also educated over signs and symptoms of infection. 



TIME SPENT:  Total time coordinating the discharge of this patient was 25 
minutes.







Job ID:  [id number]



MTDD

## 2020-05-14 NOTE — PRG
DATE OF SERVICE:  05/14/2020



SUBJECTIVE:  Mr. Mustafa is doing much better.  He did undergo EP study yesterday,

that was negative for VT.  He underwent a pacemaker implantation.  His lower rate is

set at 75.  He is feeling much better this morning. 



OBJECTIVE:  VITAL SIGNS:  Blood pressure 125/80, pulse 74, temperature 97.5. 

LUNGS:  Clear to auscultation. 

HEART:  Regular rate and rhythm. 

ABDOMEN:  Soft, nontender, nondistended. 

EXTREMITIES:  No edema.



IMPRESSION:  

1. Symptomatic bradycardia.

2. Ventricular tachycardia.

3. Recent myocardial infarction.



RECOMMENDATIONS:  

1. We will continue aspirin and Plavix.

2. Add low-dose metoprolol.  He is currently on Lopressor 25 b.i.d. to help suppress

PVCs and VT (he could not start this in the past due to bradycardia). 

3. Plan is to follow up with Mr. Mustafa in the next 1 to 2 weeks.  No further

recommendations. 







Job ID:  [id number]

## 2020-05-14 NOTE — EKG
Test Reason : 

Blood Pressure : ***/*** mmHG

Vent. Rate : 073 BPM     Atrial Rate : 073 BPM

   P-R Int : 216 ms          QRS Dur : 098 ms

    QT Int : 438 ms       P-R-T Axes : 047 025 -40 degrees

   QTc Int : 482 ms

 

Atrial-paced rhythm with prolonged AV conduction with frequent ventricular-paced complexes in a patte
rn of bigeminy

Inferior infarct , age undetermined

Abnormal ECG

When compared with ECG of 12-MAY-2020 11:41, (Unconfirmed)

Electronic ventricular pacemaker has replaced Sinus rhythm

Confirmed by FRANCES BOWERS, DR. VARGHESE ([id number]) on 5/14/2020 5:32:35 PM

 

Referred By:  [facility]           Confirmed By:DR. BAIMAEL DANIELS MD

## 2020-05-16 NOTE — EKG
Test Reason : BRADYCARDIA

Blood Pressure : ***/*** mmHG

Vent. Rate : 065 BPM     Atrial Rate : 065 BPM

   P-R Int : 138 ms          QRS Dur : 094 ms

    QT Int : 448 ms       P-R-T Axes : 062 014 -37 degrees

   QTc Int : 465 ms

 

Sinus rhythm with frequent Premature ventricular complexes in a pattern of bigeminy

Inferior infarct , age undetermined

T wave abnormality, consider lateral ischemia

Abnormal ECG

 

Confirmed by CHINYERE JONES DO ([id number]),  FRITZ RICK ([id number]) on 5/16/2020 1:01:11 PM

 

Referred By:  [facility]           Confirmed By:CHINYERE JONES DO